# Patient Record
Sex: MALE | Race: WHITE | NOT HISPANIC OR LATINO | Employment: OTHER | URBAN - METROPOLITAN AREA
[De-identification: names, ages, dates, MRNs, and addresses within clinical notes are randomized per-mention and may not be internally consistent; named-entity substitution may affect disease eponyms.]

---

## 2017-01-02 ENCOUNTER — MYC MEDICAL ADVICE (OUTPATIENT)
Dept: PEDIATRICS | Facility: CLINIC | Age: 57
End: 2017-01-02

## 2017-01-03 ENCOUNTER — OFFICE VISIT (OUTPATIENT)
Dept: PEDIATRICS | Facility: CLINIC | Age: 57
End: 2017-01-03
Payer: COMMERCIAL

## 2017-01-03 VITALS
RESPIRATION RATE: 16 BRPM | DIASTOLIC BLOOD PRESSURE: 84 MMHG | BODY MASS INDEX: 30.44 KG/M2 | SYSTOLIC BLOOD PRESSURE: 120 MMHG | TEMPERATURE: 97.9 F | WEIGHT: 250 LBS | HEIGHT: 76 IN | HEART RATE: 88 BPM

## 2017-01-03 DIAGNOSIS — F32.0 MAJOR DEPRESSIVE DISORDER, SINGLE EPISODE, MILD (H): ICD-10-CM

## 2017-01-03 DIAGNOSIS — F43.0 ACUTE REACTION TO STRESS: Primary | ICD-10-CM

## 2017-01-03 PROCEDURE — 99214 OFFICE O/P EST MOD 30 MIN: CPT | Performed by: INTERNAL MEDICINE

## 2017-01-03 RX ORDER — ESCITALOPRAM OXALATE 10 MG/1
5-10 TABLET ORAL DAILY
Qty: 90 TABLET | Refills: 3 | Status: SHIPPED | OUTPATIENT
Start: 2017-01-03 | End: 2017-01-31

## 2017-01-03 ASSESSMENT — ANXIETY QUESTIONNAIRES
IF YOU CHECKED OFF ANY PROBLEMS ON THIS QUESTIONNAIRE, HOW DIFFICULT HAVE THESE PROBLEMS MADE IT FOR YOU TO DO YOUR WORK, TAKE CARE OF THINGS AT HOME, OR GET ALONG WITH OTHER PEOPLE: SOMEWHAT DIFFICULT
3. WORRYING TOO MUCH ABOUT DIFFERENT THINGS: SEVERAL DAYS
2. NOT BEING ABLE TO STOP OR CONTROL WORRYING: NOT AT ALL
5. BEING SO RESTLESS THAT IT IS HARD TO SIT STILL: NOT AT ALL
7. FEELING AFRAID AS IF SOMETHING AWFUL MIGHT HAPPEN: MORE THAN HALF THE DAYS
1. FEELING NERVOUS, ANXIOUS, OR ON EDGE: SEVERAL DAYS
GAD7 TOTAL SCORE: 7
6. BECOMING EASILY ANNOYED OR IRRITABLE: NOT AT ALL

## 2017-01-03 ASSESSMENT — PATIENT HEALTH QUESTIONNAIRE - PHQ9: 5. POOR APPETITE OR OVEREATING: NEARLY EVERY DAY

## 2017-01-03 NOTE — PATIENT INSTRUCTIONS
Major Depression  What is major depression?   Depression is a condition in which you feel sad, hopeless, and uninterested in daily life. Major depression is severe depression that lasts for at least 2 full weeks.   How does it occur?   Major depression may start after some event or it may not be caused by anything specific. You may have major depression after a period of having dysthymia. Dysthymia is being mildly depressed almost every day for 2 or more years. If major depression develops from dysthymia, you are more likely to have major depression in the future.   People are more likely to develop depression if they:   have family members who have had depression, bipolar disorder, or anxiety problems   are female. Women are twice as likely as men to have major depression   have a major medical problem such as heart disease or cancer   The chemicals in your nervous system and the way that brain cells communicate changes with major depression. Exactly how this works and what it means are not fully understood.   Major depression may start at any age. Teenagers and young adults, as well as older adults, are more likely to have this condition than middle-aged adults.   What are the symptoms?   Besides feeling very sad and uninterested in things you usually enjoy, you may also:   be irritable   have trouble falling asleep, wake up very early, or sleep too much   feel more anxiety or panic   notice changes in your appetite and weight, either up or down   notice changes in your energy level, usually down but sometimes feeling overexcited   lose sexual desire and function   feel worthless and guilty   have trouble concentrating or remembering things   feel hopeless or just not care about anything   have unexplained physical symptoms   think often about death or suicide   Other symptoms may vary with age. If you are a teenager, you may be irritable, get angry, abuse substances, and cause trouble with parents  and at school. If you are a young or middle-aged adult, you may abuse substances such as drugs or alcohol, have physical problems (like pain or stomach upsets), or feel nervous.   Depressed older people are more likely to complain of physical problems than that they are feeling sad, anxious, or hopeless. Tiredness, mood changes, sleepiness, and memory problems may be side effects of medicines rather than symptoms of depression. Other medical conditions, such as diabetes, heart disease, and Alzheimer's disease, can also cause similar symptoms.   How is it diagnosed?   Your healthcare provider or a mental health professional will ask about your symptoms and any drug or alcohol use. You may have lab tests to rule out medical problems such as hormone imbalances. There are no lab tests that directly diagnose depression.   How is it treated?   Do not try to overcome clinical depression by yourself. It can usually be successfully treated with psychotherapy, antidepressant medicine, or both. Discuss this with your healthcare provider or therapist.   Medicine  Several types of prescription medicines can help treat major depression. Your healthcare provider will work with you to carefully select the right medicine for you.   You must take these medicines daily for 3 to 6 weeks to get full benefit from them. Most people benefit from taking these medicines for at least 6 months.   No nonprescription medicines are effective to treat major depression.   Psychotherapy   Seeing a mental health therapist can help with all forms of depression. You may need therapy for a short time or for many months. One very helpful form of psychotherapy is cognitive behavioral therapy (CBT). CBT helps you identify and change thought processes that can lead to depression. Replacing negative thoughts with more positive ones reduces depression. Interpersonal therapy has also been shown to work very well.   Diets rich in fruits and vegetables are  recommended for people with depression. A multivitamin and mineral supplement may also be recommended.   Claims have been made that certain herbal and dietary products help control depression symptoms. Omega-3 fatty acids may help to reduce symptoms of depression. Dugway's wort may help mild symptoms of depression. It will not help severe cases of depression. It may worsen bipolar disorder. No herb or dietary supplement has been proven to consistently or completely relieve depression. Supplements are not tested or standardized and may vary in strengths and effects. They may have side effects and are not always safe.   Learning ways to relax may help. Yoga and meditation may also be helpful. You may want to talk with your healthcare provider about using these methods along with medicines and psychotherapy.   How long will the effects last?   Major depression usually improves within a few weeks. Some people have it only once, while others have many episodes. Major depression can be shortened, and possibly prevented, with treatment.   What can I do to help myself or my loved one?   Seeking treatment quickly is the best thing to do. Watch closely for the signs of depression. Get treatment before the symptoms become bad.   Certain medicines can add to the symptoms of depression. If you have had depression, tell all healthcare providers who treat you about all medicines you are taking, including nonprescription products and natural remedies.   Maintaining a healthy lifestyle and social activities are most important. To help prevent depression:   Exercise for at least 30 minutes every day, for example a brisk walk.   Learn which activities make you feel better and do them often.   Talk to your family and friends.   Eat a healthy diet.   Avoid alcohol, caffeine, and nicotine.   Do not use drugs.   Learn ways to lower stress, such as breathing and muscle relaxation exercises.   When should I seek help?   If you are showing  "the signs of major depression, seek professional help quickly. Do not try to treat your depression by yourself. Professional treatment is necessary.   Most of the time, you will feel much better after a few weeks of treatment. Some people with untreated major depression commit suicide. Many more attempt suicide or try to hurt themselves. After treatment and feeling better, these same people usually cannot believe that once they felt so bad and wanted to die.   Get emergency care if you or a loved one has serious thoughts of suicide or harming others.   For more information, see:   Depression: Its Symptoms and Treatment  Adjustment Disorders with Depressed Mood  Cognitive Therapy    Published by "[employer].  This content is reviewed periodically and is subject to change as new health information becomes available. The information is intended to inform and educate and is not a replacement for medical evaluation, advice, diagnosis or treatment by a healthcare professional.   Written by Robina Huston, PhD, for "[employer].   ? 2010 [facility] and/or its affiliates. All Rights Reserved.   Copyright   [employer] 2011  Adult Health Advisor                       Generalized Anxiety Disorder  What is generalized anxiety disorder?   Generalized anxiety disorder (ROLAN) is a condition in which a person worries excessively and unrealistically. They may also be jittery, restless, or dizzy. When these symptoms last for at least 6 months, a diagnosis of ROLAN may be made.  ROLAN may exist by itself, or with both anxiety and depression. It is estimated that almost 5% of people have had this disorder during their lives.  How does it occur?   The cause of ROLAN is unknown. Genetic and environmental factors play a role. Women have ROLAN about twice as often as men.  The worry in ROLAN is not about panic attacks or being afraid in public places. It is typically \"free-floating\" anxiety out of proportion to any real life situation. " The worrying can interfere with normal day-to-day activities and work or school.  What are the symptoms?   Symptoms include excessive, unrealistic, and uncontrollable worrying about many things such as:  the state of the world   the economy   violence in society   your job   the bills   chores   family members  Physical symptoms such as muscle tension, sleep problems, or feeling on edge usually go along with anxiety. A person may be short-tempered and unable to focus or concentrate because of the worrying. Other symptoms include sweating, shaking, having a very fast heartbeat, feeling out of breath, needing to go to the bathroom often and feeling like fainting. People with ROLAN may be uneasy in a group or in a waiting room.  How is it diagnosed?   There is no lab test for ROLAN. Your healthcare provider or therapist will ask about your symptoms. He or she will make sure you do not have a medical illness or drug or alcohol problem that could cause the symptoms. Some medicines can cause anxiety or make it worse. These include asthma medicines, stimulants, and steroids such as prednisone.  If you have had the symptoms for at least 6 months, if you have had to cut back on your activities, and if you find it difficult to get things done, you may be diagnosed with generalized anxiety disorder.  How is it treated?   Different types of approaches have proven helpful in treating ROLAN. These include medicine, behavior therapy, relaxation therapy, cognitive therapy, and stress management techniques. Which treatments your healthcare provider or therapist uses may depend upon how much the disorder interferes with your day-to-day life.  Several types of medicines can help treat ROLAN. Your healthcare provider will work with you to carefully select the best one for you.  How long will the effects last?   ROLAN can last many years and sometimes an entire lifetime.   How can I take care of myself?   Get support. Talk with family and  friends. Consider joining a support group in your area. Go to a stress management class in your local community.   Learn to manage stress. Ask for help at home and work when the load is too great to handle. Find ways to relax, for example take up a hobby, listen to music, watch movies, take walks. Try deep breathing exercises when you feel stressed.   Take care of your physical health. Try to get at least 7 to 9 hours of sleep each night. Eat a healthy diet. Limit caffeine. If you smoke, quit. Avoid alcohol and drugs, because they can make your symptoms worse. Exercise according to your healthcare provider's instructions.   Check your medicines. To help prevent problems, tell your healthcare provider and pharmacist about all the medicines, natural remedies, vitamins, and other supplements that you take.   Contact your healthcare provider or therapist if you have any questions or your symptoms seem to be getting worse.  You may also want to contact [facility] (formerly the [employer] or [employer]HA). NMHA's toll-free Information Center number is 4-909-835-[facility]. Its web site address is http://www.NMHA.org

## 2017-01-03 NOTE — NURSING NOTE
"Chief Complaint   Patient presents with     RECHECK       Initial /84 mmHg  Pulse 88  Temp(Src) 97.9  F (36.6  C) (Oral)  Resp 16  Ht 6' 4\" (1.93 m)  Wt 250 lb (113.399 kg)  BMI 30.44 kg/m2 Estimated body mass index is 30.44 kg/(m^2) as calculated from the following:    Height as of this encounter: 6' 4\" (1.93 m).    Weight as of this encounter: 250 lb (113.399 kg).  BP completed using cuff size: large Rt arm  Donna QUINTERO, MIKAYLA,AAMA      "

## 2017-01-03 NOTE — MR AVS SNAPSHOT
After Visit Summary   1/3/2017    Sang Mtz    MRN: [id number]           Patient Information     Date Of Birth          1960        Visit Information        Provider Department      1/3/2017 1:40 PM Jethro Villalba MD [facility]        Today's Diagnoses     Major depressive disorder, single episode, mild (H)    -  1     Acute reaction to stress           Care Instructions                 Major Depression  What is major depression?   Depression is a condition in which you feel sad, hopeless, and uninterested in daily life. Major depression is severe depression that lasts for at least 2 full weeks.   How does it occur?   Major depression may start after some event or it may not be caused by anything specific. You may have major depression after a period of having dysthymia. Dysthymia is being mildly depressed almost every day for 2 or more years. If major depression develops from dysthymia, you are more likely to have major depression in the future.   People are more likely to develop depression if they:   have family members who have had depression, bipolar disorder, or anxiety problems   are female. Women are twice as likely as men to have major depression   have a major medical problem such as heart disease or cancer   The chemicals in your nervous system and the way that brain cells communicate changes with major depression. Exactly how this works and what it means are not fully understood.   Major depression may start at any age. Teenagers and young adults, as well as older adults, are more likely to have this condition than middle-aged adults.   What are the symptoms?   Besides feeling very sad and uninterested in things you usually enjoy, you may also:   be irritable   have trouble falling asleep, wake up very early, or sleep too much   feel more anxiety or panic   notice changes in your appetite and weight, either up or down   notice changes in your energy level,  usually down but sometimes feeling overexcited   lose sexual desire and function   feel worthless and guilty   have trouble concentrating or remembering things   feel hopeless or just not care about anything   have unexplained physical symptoms   think often about death or suicide   Other symptoms may vary with age. If you are a teenager, you may be irritable, get angry, abuse substances, and cause trouble with parents and at school. If you are a young or middle-aged adult, you may abuse substances such as drugs or alcohol, have physical problems (like pain or stomach upsets), or feel nervous.   Depressed older people are more likely to complain of physical problems than that they are feeling sad, anxious, or hopeless. Tiredness, mood changes, sleepiness, and memory problems may be side effects of medicines rather than symptoms of depression. Other medical conditions, such as diabetes, heart disease, and Alzheimer's disease, can also cause similar symptoms.   How is it diagnosed?   Your healthcare provider or a mental health professional will ask about your symptoms and any drug or alcohol use. You may have lab tests to rule out medical problems such as hormone imbalances. There are no lab tests that directly diagnose depression.   How is it treated?   Do not try to overcome clinical depression by yourself. It can usually be successfully treated with psychotherapy, antidepressant medicine, or both. Discuss this with your healthcare provider or therapist.   Medicine  Several types of prescription medicines can help treat major depression. Your healthcare provider will work with you to carefully select the right medicine for you.   You must take these medicines daily for 3 to 6 weeks to get full benefit from them. Most people benefit from taking these medicines for at least 6 months.   No nonprescription medicines are effective to treat major depression.   Psychotherapy   Seeing a mental health therapist can help with  all forms of depression. You may need therapy for a short time or for many months. One very helpful form of psychotherapy is cognitive behavioral therapy (CBT). CBT helps you identify and change thought processes that can lead to depression. Replacing negative thoughts with more positive ones reduces depression. Interpersonal therapy has also been shown to work very well.   Diets rich in fruits and vegetables are recommended for people with depression. A multivitamin and mineral supplement may also be recommended.   Claims have been made that certain herbal and dietary products help control depression symptoms. Omega-3 fatty acids may help to reduce symptoms of depression. Corby's wort may help mild symptoms of depression. It will not help severe cases of depression. It may worsen bipolar disorder. No herb or dietary supplement has been proven to consistently or completely relieve depression. Supplements are not tested or standardized and may vary in strengths and effects. They may have side effects and are not always safe.   Learning ways to relax may help. Yoga and meditation may also be helpful. You may want to talk with your healthcare provider about using these methods along with medicines and psychotherapy.   How long will the effects last?   Major depression usually improves within a few weeks. Some people have it only once, while others have many episodes. Major depression can be shortened, and possibly prevented, with treatment.   What can I do to help myself or my loved one?   Seeking treatment quickly is the best thing to do. Watch closely for the signs of depression. Get treatment before the symptoms become bad.   Certain medicines can add to the symptoms of depression. If you have had depression, tell all healthcare providers who treat you about all medicines you are taking, including nonprescription products and natural remedies.   Maintaining a healthy lifestyle and social activities are most  important. To help prevent depression:   Exercise for at least 30 minutes every day, for example a brisk walk.   Learn which activities make you feel better and do them often.   Talk to your family and friends.   Eat a healthy diet.   Avoid alcohol, caffeine, and nicotine.   Do not use drugs.   Learn ways to lower stress, such as breathing and muscle relaxation exercises.   When should I seek help?   If you are showing the signs of major depression, seek professional help quickly. Do not try to treat your depression by yourself. Professional treatment is necessary.   Most of the time, you will feel much better after a few weeks of treatment. Some people with untreated major depression commit suicide. Many more attempt suicide or try to hurt themselves. After treatment and feeling better, these same people usually cannot believe that once they felt so bad and wanted to die.   Get emergency care if you or a loved one has serious thoughts of suicide or harming others.   For more information, see:   Depression: Its Symptoms and Treatment  Adjustment Disorders with Depressed Mood  Cognitive Therapy    Published by [employer].  This content is reviewed periodically and is subject to change as new health information becomes available. The information is intended to inform and educate and is not a replacement for medical evaluation, advice, diagnosis or treatment by a healthcare professional.   Written by Robina Huston, PhD, for [employer].   ? 2010 [employer] and/or its affiliates. All Rights Reserved.   Copyright   [employer] 2011  Adult Health Advisor                       Generalized Anxiety Disorder  What is generalized anxiety disorder?   Generalized anxiety disorder (ROLAN) is a condition in which a person worries excessively and unrealistically. They may also be jittery, restless, or dizzy. When these symptoms last for at least 6 months, a diagnosis of ROLAN may be made.  ROLAN may exist by itself, or  "with both anxiety and depression. It is estimated that almost 5% of people have had this disorder during their lives.  How does it occur?   The cause of ROLAN is unknown. Genetic and environmental factors play a role. Women have ROLAN about twice as often as men.  The worry in ROLAN is not about panic attacks or being afraid in public places. It is typically \"free-floating\" anxiety out of proportion to any real life situation. The worrying can interfere with normal day-to-day activities and work or school.  What are the symptoms?   Symptoms include excessive, unrealistic, and uncontrollable worrying about many things such as:  the state of the world   the economy   violence in society   your job   the bills   chores   family members  Physical symptoms such as muscle tension, sleep problems, or feeling on edge usually go along with anxiety. A person may be short-tempered and unable to focus or concentrate because of the worrying. Other symptoms include sweating, shaking, having a very fast heartbeat, feeling out of breath, needing to go to the bathroom often and feeling like fainting. People with ROLAN may be uneasy in a group or in a waiting room.  How is it diagnosed?   There is no lab test for ROLAN. Your healthcare provider or therapist will ask about your symptoms. He or she will make sure you do not have a medical illness or drug or alcohol problem that could cause the symptoms. Some medicines can cause anxiety or make it worse. These include asthma medicines, stimulants, and steroids such as prednisone.  If you have had the symptoms for at least 6 months, if you have had to cut back on your activities, and if you find it difficult to get things done, you may be diagnosed with generalized anxiety disorder.  How is it treated?   Different types of approaches have proven helpful in treating ROLAN. These include medicine, behavior therapy, relaxation therapy, cognitive therapy, and stress management techniques. Which " treatments your healthcare provider or therapist uses may depend upon how much the disorder interferes with your day-to-day life.  Several types of medicines can help treat ROLAN. Your healthcare provider will work with you to carefully select the best one for you.  How long will the effects last?   ROLAN can last many years and sometimes an entire lifetime.   How can I take care of myself?   Get support. Talk with family and friends. Consider joining a support group in your area. Go to a stress management class in your local community.   Learn to manage stress. Ask for help at home and work when the load is too great to handle. Find ways to relax, for example take up a hobby, listen to music, watch movies, take walks. Try deep breathing exercises when you feel stressed.   Take care of your physical health. Try to get at least 7 to 9 hours of sleep each night. Eat a healthy diet. Limit caffeine. If you smoke, quit. Avoid alcohol and drugs, because they can make your symptoms worse. Exercise according to your healthcare provider's instructions.   Check your medicines. To help prevent problems, tell your healthcare provider and pharmacist about all the medicines, natural remedies, vitamins, and other supplements that you take.   Contact your healthcare provider or therapist if you have any questions or your symptoms seem to be getting worse.  You may also want to contact [facility] (formerly the National Mental Health Association or NM). [facility]'s toll-free Information Center number is 1-463-602-[facility]. Its web site address is http://[email or web address].[email or web address]            Follow-ups after your visit        Additional Services     MENTAL HEALTH REFERRAL       Your provider has referred you to: [facility]: [facility] - Counseling (Individual/Couples/Family) - [facility] [place] [phone]   *Patient will be contacted by [facility] scheduling partner, [facility]), to schedule an  appointment.  Patients may also call [facility] to schedule.    All scheduling is subject to the client's specific insurance plan & benefits, provider/location availability, and provider clinical specialities.  Please arrive 15 minutes early for your first appointment and bring your completed paperwork.    Please be aware that coverage of these services is subject to the terms and limitations of your health insurance plan.  Call member services at your health plan with any benefit or coverage questions.                  Your next 10 appointments already scheduled     Jan 31, 2017  2:40 PM   Office Visit with Jethro Villalba MD   [facility] ([facility])    [address] [place] [address]  [address]  [facility] [address] [phone]           Bring a current list of meds and any records pertaining to this visit.  For Physicals, please bring immunization records and any forms needing to be filled out.  Please arrive 10 minutes early to complete paperwork.              Who to contact     If you have questions or need follow up information about today's clinic visit or your schedule please contact [facility] directly at [phone].  Normal or non-critical lab and imaging results will be communicated to you by MyChart, letter or phone within 4 business days after the clinic has received the results. If you do not hear from us within 7 days, please contact the clinic through [facility] or phone. If you have a critical or abnormal lab result, we will notify you by phone as soon as possible.  Submit refill requests through [facility] or call your pharmacy and they will forward the refill request to us. Please allow 3 business days for your refill to be completed.          Additional Information About Your Visit        [employer] Information     [facility] gives you secure access to your electronic health record. If you see a primary care provider, you can also send messages to your care team and make  "appointments. If you have questions, please call your primary care clinic.  If you do not have a primary care provider, please call [phone] and they will assist you.        Care EveryWhere ID     This is your Care EveryWhere ID. This could be used by other organizations to access your [facility] medical records  [id number]        Your Vitals Were     Pulse Temperature Respirations Height BMI (Body Mass Index)       88 97.9  F (36.6  C) (Oral) 16 6' 4\" (1.93 m) 30.44 kg/m2        Blood Pressure from Last 3 Encounters:   01/03/17 120/84   12/29/16 133/87   12/27/16 124/78    Weight from Last 3 Encounters:   01/03/17 250 lb (113.399 kg)   12/29/16 250 lb (113.399 kg)   12/27/16 254 lb 4.8 oz (115.35 kg)              We Performed the Following     MENTAL HEALTH REFERRAL          Today's Medication Changes          These changes are accurate as of: 1/3/17  2:11 PM.  If you have any questions, ask your nurse or doctor.               These medicines have changed or have updated prescriptions.        Dose/Directions    escitalopram 10 MG tablet   Commonly known as:  LEXAPRO   This may have changed:  how much to take   Used for:  Major depressive disorder, single episode, mild (H), Acute reaction to stress   Changed by:  Jethro Villalba MD        Dose:  5-10 mg   Take 0.5-1 tablets (5-10 mg) by mouth daily   Quantity:  90 tablet   Refills:  3            Where to get your medicines      These medications were sent to [facility] [place] - [address]   [address]  [address], [place] [address]     Phone:  [phone]    - escitalopram 10 MG tablet             Primary Care Provider Office Phone # Fax #    Jethro Villalba [phone]       [facility] [address] [place] [address]        Thank you!     Thank you for choosing [facility]  for your care. Our goal is always to provide you with excellent care. Hearing back from our " patients is one way we can continue to improve our services. Please take a few minutes to complete the written survey that you may receive in the mail after your visit with us. Thank you!             Your Updated Medication List - Protect others around you: Learn how to safely use, store and throw away your medicines at www.disposemymeds.org.          This list is accurate as of: 1/3/17  2:11 PM.  Always use your most recent med list.                   Brand Name Dispense Instructions for use    aspirin 81 MG tablet      1 TABLET DAILY       escitalopram 10 MG tablet    LEXAPRO    90 tablet    Take 0.5-1 tablets (5-10 mg) by mouth daily       fluticasone 50 MCG/ACT spray    FLONASE    16 g    Spray 1-2 sprays into both nostrils daily       ibuprofen 400-800 mg tablet    ADVIL,MOTRIN     Take 400-800 mg by mouth every 6 hours as needed.       Multi-vitamin Tabs tablet   Generic drug:  multivitamin, therapeutic with minerals          omeprazole 20 MG tablet     60 tablet    Take 1 tablet (20 mg) by mouth 2 times daily Take 30-60 minutes before a meal.       sildenafil 100 MG cap/tab    VIAGRA    3 tablet    Take 0.5-1 tablets by mouth daily as needed for erectile dysfunction.

## 2017-01-03 NOTE — PROGRESS NOTES
SUBJECTIVE:                                                    Sang Mtz is a 56 year old male who presents to clinic today for the following health issues:    ED/UC Followup:    Facility:  [facility]  Date of visit: 12/29/2017  Reason for visit: Vertigo  Current Status: Pt stated that haven't experience Sx since ED visit. Pt wonder if recently prescribed medication caused Sx.       patient here with sig other, doing better. has been working on mindfulness, etc and this is helping, is trying to exercise too and feels well supported. did take the ssri for a few days, but hasn't for the last 2. not sure if it was helping or not. wants to review next steps. not thoughts of self harm or hurting others.     Problem list and histories reviewed & adjusted, as indicated.  Additional history: as documented    Patient Active Problem List    Diagnosis Date Noted     Esophageal reflux 11/18/2014     Priority: Medium     ACP (advance care planning) 12/15/2015     Advance Care Planning 12/15/2015: ACP Review of Chart / Resources Provided:  Reviewed chart for advance care plan.  Sang Mtz has no plan or code status on file. Discussed available resources and provided with information. Advance Care Planning invitation sent. Code status updated and sent to provider for review pending further ACP discussions.  Confirmed/documented legally designated decision maker(s). Added by Rachele Junior             Splenomegaly 02/08/2012     Anxiety 02/08/2012     Kidney stone 02/08/2012     Liver nodule 02/08/2012     Mass of lung 02/08/2012     Erectile dysfunction 09/13/2010     Hypertriglyceridemia 09/13/2010     HYPERLIPIDEMIA LDL GOAL <130 02/10/2010     Social History   Substance Use Topics     Smoking status: Never Smoker      Smokeless tobacco: Never Used     Alcohol Use: No     Family History   Problem Relation Age of Onset     Hypertension Father      CANCER Father      cml     C.A.D. Maternal Grandfather      late      "Cancer - colorectal Father      diagnosis at age 75     Eye Disorder No family hx of      no glaucoma       ROS:  A complete 10 point review of systems was taken and negative except for those noted in the subjective/HPI section(s) above     Problem list, Medication list, Allergies, and Medical/Social/Surgical histories reviewed in [facility] and updated as appropriate.    OBJECTIVE:                                                    /84 mmHg  Pulse 88  Temp(Src) 97.9  F (36.6  C) (Oral)  Resp 16  Ht 6' 4\" (1.93 m)  Wt 250 lb (113.399 kg)  BMI 30.44 kg/m2   Constitutional: healthy, alert and no distress  Psychiatric: mentation appears normal and affect normal/bright  Presentation- normal:Abstract reasoning  Attention and concentration  Coherency and relevance of thought  Dress, grooming, personal hygiene  Facial expression  Information  Judgment  Memory  Mood  Orientation  Perceptions  Posture and motor behavoir  Speech  Thought content  Vocabulary,abnormal:none        ROLAN-7 SCORE 1/3/2017   Total Score 7                  ASSESSMENT/PLAN:                                                        ICD-10-CM    1. Acute reaction to stress F43.0 escitalopram (LEXAPRO) 10 MG tablet     MENTAL HEALTH REFERRAL   2. Major depressive disorder, single episode, mild (H) F32.0 escitalopram (LEXAPRO) 10 MG tablet     MENTAL HEALTH REFERRAL   discussed with patient (or patient's parents/caregiver) pathophysiology of condition and treatment options. patient id doing better and has made good progress.  had long talk with patient and his sig other, plan to restart the SSRI at 1/2 tab for 1 week then taper up. will see if patient can get in with our MH provider here and continue with mindfulness exercises, etc. discussed with patient (or patient's parents/caregiver) pathophysiology of condition and treatment options.  Reviewed concept of anxiety (and depression) as function of biochemical imbalance of neurotransmitters/rationale " "for treatment. reviewed mindfulness, good self talk, exercise, good diet and sleep, etc and other modalities to help manage symptoms too.   avoid etoh and other drugs to treat symptoms, will likely make worse with other consequences too  Risks and benefits of medication(s) reviewed with patient.  Questions answered.  Counseling advised  Followup appointment in 1 month(s) - sooner if needed and patient can call anytime  Patient instructed to call for significant side effects medications or problems  Patient advised immediate presentation to hospital for suicidal thought, etc.     Patient verbalized understanding and is agreeable to this plan.           Estimated body mass index is 30.44 kg/(m^2) as calculated from the following:    Height as of this encounter: 6' 4\" (1.93 m).    Weight as of this encounter: 250 lb (113.399 kg).      Return to clinic as needed or if symptoms persist, change, worsen or if any new symptoms develop.    Total time spent with patient was 30 min, of which greater than 25 minutes of face to face time and/or coordination of care discussing the above issue(s).     Jethro Villalba M.D.  Internal Medicine-Pediatrics                "

## 2017-01-04 ASSESSMENT — ANXIETY QUESTIONNAIRES: GAD7 TOTAL SCORE: 7

## 2017-01-26 ENCOUNTER — MYC MEDICAL ADVICE (OUTPATIENT)
Dept: PEDIATRICS | Facility: CLINIC | Age: 57
End: 2017-01-26

## 2017-01-31 ENCOUNTER — OFFICE VISIT (OUTPATIENT)
Dept: PEDIATRICS | Facility: CLINIC | Age: 57
End: 2017-01-31
Payer: COMMERCIAL

## 2017-01-31 VITALS
WEIGHT: 255.8 LBS | TEMPERATURE: 98.1 F | DIASTOLIC BLOOD PRESSURE: 76 MMHG | BODY MASS INDEX: 31.15 KG/M2 | HEART RATE: 104 BPM | RESPIRATION RATE: 20 BRPM | SYSTOLIC BLOOD PRESSURE: 104 MMHG

## 2017-01-31 DIAGNOSIS — F32.0 MAJOR DEPRESSIVE DISORDER, SINGLE EPISODE, MILD (H): ICD-10-CM

## 2017-01-31 DIAGNOSIS — F41.9 ANXIETY: ICD-10-CM

## 2017-01-31 DIAGNOSIS — E78.5 HYPERLIPIDEMIA LDL GOAL <130: ICD-10-CM

## 2017-01-31 DIAGNOSIS — F43.0 ACUTE REACTION TO STRESS: Primary | ICD-10-CM

## 2017-01-31 DIAGNOSIS — Z11.59 NEED FOR HEPATITIS C SCREENING TEST: ICD-10-CM

## 2017-01-31 DIAGNOSIS — K21.9 GASTROESOPHAGEAL REFLUX DISEASE WITHOUT ESOPHAGITIS: ICD-10-CM

## 2017-01-31 LAB
ALBUMIN SERPL-MCNC: 4.2 G/DL (ref 3.4–5)
ALP SERPL-CCNC: 107 U/L (ref 40–150)
ALT SERPL W P-5'-P-CCNC: 49 U/L (ref 0–70)
ANION GAP SERPL CALCULATED.3IONS-SCNC: 7 MMOL/L (ref 3–14)
AST SERPL W P-5'-P-CCNC: 15 U/L (ref 0–45)
BILIRUB SERPL-MCNC: 0.7 MG/DL (ref 0.2–1.3)
BUN SERPL-MCNC: 26 MG/DL (ref 7–30)
CALCIUM SERPL-MCNC: 9.8 MG/DL (ref 8.5–10.1)
CHLORIDE SERPL-SCNC: 107 MMOL/L (ref 94–109)
CHOLEST SERPL-MCNC: 172 MG/DL
CO2 SERPL-SCNC: 27 MMOL/L (ref 20–32)
CREAT SERPL-MCNC: 0.91 MG/DL (ref 0.66–1.25)
GFR SERPL CREATININE-BSD FRML MDRD: 86 ML/MIN/1.7M2
GLUCOSE SERPL-MCNC: 95 MG/DL (ref 70–99)
HCV AB SERPL QL IA: NORMAL
HDLC SERPL-MCNC: 32 MG/DL
LDLC SERPL CALC-MCNC: 111 MG/DL
NONHDLC SERPL-MCNC: 140 MG/DL
POTASSIUM SERPL-SCNC: 4.2 MMOL/L (ref 3.4–5.3)
PROT SERPL-MCNC: 7.4 G/DL (ref 6.8–8.8)
PSA SERPL-ACNC: 1.49 UG/L (ref 0–4)
SODIUM SERPL-SCNC: 141 MMOL/L (ref 133–144)
TRIGL SERPL-MCNC: 147 MG/DL

## 2017-01-31 PROCEDURE — 36415 COLL VENOUS BLD VENIPUNCTURE: CPT | Performed by: INTERNAL MEDICINE

## 2017-01-31 PROCEDURE — G0103 PSA SCREENING: HCPCS | Performed by: INTERNAL MEDICINE

## 2017-01-31 PROCEDURE — 86803 HEPATITIS C AB TEST: CPT | Performed by: INTERNAL MEDICINE

## 2017-01-31 PROCEDURE — 80053 COMPREHEN METABOLIC PANEL: CPT | Performed by: INTERNAL MEDICINE

## 2017-01-31 PROCEDURE — 99213 OFFICE O/P EST LOW 20 MIN: CPT | Performed by: INTERNAL MEDICINE

## 2017-01-31 PROCEDURE — 80061 LIPID PANEL: CPT | Performed by: INTERNAL MEDICINE

## 2017-01-31 RX ORDER — ESCITALOPRAM OXALATE 10 MG/1
10 TABLET ORAL DAILY
Qty: 90 TABLET | Refills: 3 | Status: SHIPPED | OUTPATIENT
Start: 2017-01-31 | End: 2017-07-10

## 2017-01-31 ASSESSMENT — ANXIETY QUESTIONNAIRES
1. FEELING NERVOUS, ANXIOUS, OR ON EDGE: MORE THAN HALF THE DAYS
3. WORRYING TOO MUCH ABOUT DIFFERENT THINGS: NOT AT ALL
2. NOT BEING ABLE TO STOP OR CONTROL WORRYING: NOT AT ALL
GAD7 TOTAL SCORE: 2
IF YOU CHECKED OFF ANY PROBLEMS ON THIS QUESTIONNAIRE, HOW DIFFICULT HAVE THESE PROBLEMS MADE IT FOR YOU TO DO YOUR WORK, TAKE CARE OF THINGS AT HOME, OR GET ALONG WITH OTHER PEOPLE: SOMEWHAT DIFFICULT
6. BECOMING EASILY ANNOYED OR IRRITABLE: NOT AT ALL
7. FEELING AFRAID AS IF SOMETHING AWFUL MIGHT HAPPEN: NOT AT ALL
5. BEING SO RESTLESS THAT IT IS HARD TO SIT STILL: NOT AT ALL

## 2017-01-31 ASSESSMENT — PATIENT HEALTH QUESTIONNAIRE - PHQ9: 5. POOR APPETITE OR OVEREATING: NOT AT ALL

## 2017-01-31 NOTE — PATIENT INSTRUCTIONS
Your provider has referred you to: [facility]: [facility] - Counseling (Individual/Couples/Family) - [facility] [place] [phone]   *Patient will be contacted by [facility]'s scheduling partner, [facility]), to schedule an appointment.  Patients may also call [facility] to schedule.    All scheduling is subject to the client's specific insurance plan & benefits, provider/location availability, and provider clinical specialities.  Please arrive 15 minutes early for your first appointment and bring your completed paperwork.    Please be aware that coverage of these services is subject to the terms and limitations of your health insurance plan.  Call member services at your health plan with any benefit or coverage questions.

## 2017-01-31 NOTE — PROGRESS NOTES
SUBJECTIVE:                                                    Sang Mtz is a 56 year old male who presents to clinic today for the following health issues:      Depression and Anxiety Follow-Up    Status since last visit: Improved, both patient and his wife have noted sig improvement with medications and with MH evaluation    Other associated symptoms:tired after taking medication    Complicating factors:     Significant life event: Yes-  USP     Current substance abuse: None    No flowsheet data found.  ROLAN-7 SCORE 1/3/2017   Total Score 7        PHQ-9  English      PHQ-9   Any Language     GAD7         Amount of exercise or physical activity: 4-5 days/week for an average of 45-60 minutes    Problems taking medications regularly: No    Medication side effects: very sleepy after taking medication    Diet: regular (no restrictions)    PHQ-9 SCORE 1/31/2017   Total Score 0     ROLAN-7 SCORE 1/3/2017 1/31/2017   Total Score 7 2     patient addisong kalin, on 1/2 tab per day and tolerating well. hasn't scheduled with therapist yet, needs number, had reflux episosde alst night, on PPI and needed to take tums and drink some water, was pretty painful but resolved in 30 min. ate some peanuts and that is what triggered it. no symtposm today. no chest pain or shortness of breath. No other concerns or complaints today.         Problem list and histories reviewed & adjusted, as indicated.  Additional history: as documented    Patient Active Problem List    Diagnosis Date Noted     Esophageal reflux 11/18/2014     Priority: Medium     ACP (advance care planning) 12/15/2015     Advance Care Planning 12/15/2015: ACP Review of Chart / Resources Provided:  Reviewed chart for advance care plan.  Sang Mtz has no plan or code status on file. Discussed available resources and provided with information. Advance Care Planning invitation sent. Code status updated and sent to provider for review pending further ACP  discussions.  Confirmed/documented legally designated decision maker(s). Added by Rachele Junior             Splenomegaly 02/08/2012     Anxiety 02/08/2012     Kidney stone 02/08/2012     Liver nodule 02/08/2012     Mass of lung 02/08/2012     Erectile dysfunction 09/13/2010     Hypertriglyceridemia 09/13/2010     HYPERLIPIDEMIA LDL GOAL <130 02/10/2010     Social History   Substance Use Topics     Smoking status: Never Smoker      Smokeless tobacco: Never Used     Alcohol Use: No     Family History   Problem Relation Age of Onset     Hypertension Father      CANCER Father      cml     C.A.D. Maternal Grandfather      late     Cancer - colorectal Father      diagnosis at age 75     Eye Disorder No family hx of      no glaucoma       ROS:  A complete 10 point review of systems was taken and negative except for those noted in the subjective/HPI section(s) above     Problem list, Medication list, Allergies, and Medical/Social/Surgical histories reviewed in EPIC and updated as appropriate.    OBJECTIVE:                                                    /76 mmHg  Pulse 104  Temp(Src) 98.1  F (36.7  C) (Oral)  Resp 20  Wt 255 lb 12.8 oz (116.03 kg)   Constitutional: healthy, alert and no distress  Psychiatric: mentation appears normal and affect normal/bright  Presentation- normal:Abstract reasoning  Attention and concentration  Coherency and relevance of thought  Dress, grooming, personal hygiene  Facial expression  Information  Judgment  Memory  Mood  Orientation  Perceptions  Posture and motor behavoir  Speech  Thought content  Vocabulary  ,abnormal:NONE    PHQ-9 SCORE 1/31/2017   Total Score 0       ROLAN-7 SCORE 1/3/2017 1/31/2017   Total Score 7 2                ASSESSMENT/PLAN:                                                    (F43.0) Acute reaction to stress  (primary encounter diagnosis)  Comment: mood and symptoms markedly imprved, continue SSRI and MH support. Reviewed concept of depression as function  of biochemical imbalance of neurotransmitters/rationale for treatment. increase lexapro to 10 mg (1 tab). New medication(s) indication(s), adverse effects, risks and benefits discussed.   Risks and benefits of medication(s) reviewed with patient.  Questions answered.  Followup appointment in 3 month(s)  Patient instructed to call for significant side effects medications or problems  Patient advised immediate presentation to hospital for suicidal thought, etc.     Plan: escitalopram (LEXAPRO) 10 MG tablet          (F41.9) Anxiety  Comment: as above. discussed with patient (or patient's parents/caregiver) pathophysiology of condition and treatment options.  Reviewed concept of anxiety (and depression) as function of biochemical imbalance of neurotransmitters/rationale for treatment. reviewed mindfulness, good self talk, exercise, good diet and sleep, etc and other modalities to help manage symptoms too.   avoid etoh and other drugs to treat symptoms, will likely make worse with other consequences too  Risks and benefits of medication(s) reviewed with patient.  Questions answered.  Counseling advised  Followup appointment in 3 month(s) - appointment made   Patient instructed to call for significant side effects medications or problems  Patient advised immediate presentation to hospital for suicidal thought, etc.     Patient verbalized understanding and is agreeable to this plan.      (K21.9) Gastroesophageal reflux disease without esophagitis  Comment: discussed with patient (or patient's parents/caregiver) pathophysiology of condition and treatment options.  plan to continue with as needed H@ an dtums, may need to consider PPI and further evaluation if persists, patient confident dietary adjustments would be all is needed and will keep me posted.     (F32.0) Major depressive disorder, single episode, mild (H)  Comment: as above. Reviewed concept of depression as function of biochemical imbalance of  "neurotransmitters/rationale for treatment.   Plan: escitalopram (LEXAPRO) 10 MG tablet            Estimated body mass index is 30.44 kg/(m^2) as calculated from the following:    Height as of 1/3/17: 6' 4\" (1.93 m).    Weight as of 1/3/17: 250 lb (113.399 kg).      Return to clinic as needed or if symptoms persist, change, worsen or if any new symptoms develop.    Jethro Villalba M.D.  Internal Medicine-Pediatrics                "

## 2017-01-31 NOTE — NURSING NOTE
"Chief Complaint   Patient presents with     Depression     Anxiety       Initial /76 mmHg  Pulse 104  Temp(Src) 98.1  F (36.7  C) (Oral)  Resp 20  Wt 255 lb 12.8 oz (116.03 kg) Estimated body mass index is 31.15 kg/(m^2) as calculated from the following:    Height as of 1/3/17: 6' 4\" (1.93 m).    Weight as of this encounter: 255 lb 12.8 oz (116.03 kg).  BP completed using cuff size: large Rt arm  Donna QUINTERO, MIKAYLA,AAMA      "

## 2017-01-31 NOTE — MR AVS SNAPSHOT
After Visit Summary   1/31/2017    Sang Mtz    MRN: [id number]           Patient Information     Date Of Birth          1960        Visit Information        Provider Department      1/31/2017 2:40 PM Jethro Villalba MD [facility] [place]        Today's Diagnoses     Acute reaction to stress    -  1     Anxiety         Gastroesophageal reflux disease without esophagitis           Care Instructions    Your provider has referred you to: [facility]: [facility] - Counseling (Individual/Couples/Family) - [facility] [place] [phone]   *Patient will be contacted by [facility] scheduling partner, [facility]), to schedule an appointment.  Patients may also call [facility] to schedule.    All scheduling is subject to the client's specific insurance plan & benefits, provider/location availability, and provider clinical specialities.  Please arrive 15 minutes early for your first appointment and bring your completed paperwork.    Please be aware that coverage of these services is subject to the terms and limitations of your health insurance plan.  Call member services at your health plan with any benefit or coverage questions.        Follow-ups after your visit        Who to contact     If you have questions or need follow up information about today's clinic visit or your schedule please contact [facility] directly at [phone].  Normal or non-critical lab and imaging results will be communicated to you by MyChart, letter or phone within 4 business days after the clinic has received the results. If you do not hear from us within 7 days, please contact the clinic through MyChart or phone. If you have a critical or abnormal lab result, we will notify you by phone as soon as possible.  Submit refill requests through [employer] or call your pharmacy and they will forward the refill request to us. Please allow 3 business days for your refill to  be completed.          Additional Information About Your Visit        [employer] Information     [employer] gives you secure access to your electronic health record. If you see a primary care provider, you can also send messages to your care team and make appointments. If you have questions, please call your primary care clinic.  If you do not have a primary care provider, please call [phone] and they will assist you.        Care EveryWhere ID     This is your Care EveryWhere ID. This could be used by other organizations to access your [facility] medical records  [id number]        Your Vitals Were     Pulse Temperature Respirations             104 98.1  F (36.7  C) (Oral) 20          Blood Pressure from Last 3 Encounters:   01/31/17 104/76   01/03/17 120/84   12/29/16 133/87    Weight from Last 3 Encounters:   01/31/17 255 lb 12.8 oz (116.03 kg)   01/03/17 250 lb (113.399 kg)   12/29/16 250 lb (113.399 kg)              Today, you had the following     No orders found for display       Primary Care Provider Office Phone # Fax #    Jethro Villalba [phone]       [facility] [address] [place] [address]        Thank you!     Thank you for choosing [facility]  for your care. Our goal is always to provide you with excellent care. Hearing back from our patients is one way we can continue to improve our services. Please take a few minutes to complete the written survey that you may receive in the mail after your visit with us. Thank you!             Your Updated Medication List - Protect others around you: Learn how to safely use, store and throw away your medicines at www.disposemymeds.org.          This list is accurate as of: 1/31/17  2:42 PM.  Always use your most recent med list.                   Brand Name Dispense Instructions for use    aspirin 81 MG tablet      1 TABLET DAILY       escitalopram 10 MG tablet    LEXAPRO    90 tablet    Take 0.5-1 tablets (5-10 mg) by  mouth daily       fluticasone 50 MCG/ACT spray    FLONASE    16 g    Spray 1-2 sprays into both nostrils daily       ibuprofen 400-800 mg tablet    ADVIL,MOTRIN     Take 400-800 mg by mouth every 6 hours as needed.       Multi-vitamin Tabs tablet   Generic drug:  multivitamin, therapeutic with minerals          omeprazole 20 MG tablet     60 tablet    Take 1 tablet (20 mg) by mouth 2 times daily Take 30-60 minutes before a meal.       sildenafil 100 MG cap/tab    VIAGRA    3 tablet    Take 0.5-1 tablets by mouth daily as needed for erectile dysfunction.

## 2017-02-01 ASSESSMENT — PATIENT HEALTH QUESTIONNAIRE - PHQ9: SUM OF ALL RESPONSES TO PHQ QUESTIONS 1-9: 0

## 2017-02-01 ASSESSMENT — ANXIETY QUESTIONNAIRES: GAD7 TOTAL SCORE: 2

## 2017-02-06 PROBLEM — F32.0 MAJOR DEPRESSIVE DISORDER, SINGLE EPISODE, MILD (H): Status: ACTIVE | Noted: 2017-02-06

## 2017-02-06 PROBLEM — F32.0 MAJOR DEPRESSIVE DISORDER, SINGLE EPISODE, MILD (H): Status: ACTIVE | Noted: 2017-01-31

## 2017-02-24 ENCOUNTER — MYC MEDICAL ADVICE (OUTPATIENT)
Dept: PEDIATRICS | Facility: CLINIC | Age: 57
End: 2017-02-24

## 2017-02-24 DIAGNOSIS — K21.9 GASTROESOPHAGEAL REFLUX DISEASE WITHOUT ESOPHAGITIS: Primary | ICD-10-CM

## 2017-02-27 ENCOUNTER — TELEPHONE (OUTPATIENT)
Dept: PEDIATRICS | Facility: CLINIC | Age: 57
End: 2017-02-27

## 2017-02-27 DIAGNOSIS — K21.9 GASTROESOPHAGEAL REFLUX DISEASE WITHOUT ESOPHAGITIS: ICD-10-CM

## 2017-02-27 RX ORDER — NICOTINE POLACRILEX 4 MG/1
20 GUM, CHEWING ORAL 2 TIMES DAILY
Qty: 180 TABLET | Refills: 3 | Status: SHIPPED | OUTPATIENT
Start: 2017-02-27 | End: 2017-03-21

## 2017-02-27 NOTE — TELEPHONE ENCOUNTER
Received notice from [facility] pharmacy [place] at [phone] that medication is not covered.     Medication: Omeprazole 20 MG  Insurance phone # : [phone]  Insurance ID: [id number]    Note written on fax from pharmacy: MAX 1 CAPSULE PER DAY PER INSURANCE. PLEASE CONTACT THEM FOR PRIOR AUTH.

## 2017-02-28 DIAGNOSIS — K21.9 GASTROESOPHAGEAL REFLUX DISEASE WITHOUT ESOPHAGITIS: ICD-10-CM

## 2017-03-10 RX ORDER — NICOTINE POLACRILEX 4 MG/1
20 GUM, CHEWING ORAL 2 TIMES DAILY
Qty: 180 TABLET | Refills: 3 | Status: CANCELLED | OUTPATIENT
Start: 2017-03-10

## 2017-03-13 NOTE — TELEPHONE ENCOUNTER
PA  For quantity limits completed and faxed back. Will await respond.  Donna QUINTERO, MIKAYLA,AAMA

## 2017-03-21 DIAGNOSIS — K21.9 GASTROESOPHAGEAL REFLUX DISEASE WITHOUT ESOPHAGITIS: ICD-10-CM

## 2017-03-21 RX ORDER — NICOTINE POLACRILEX 4 MG/1
20 GUM, CHEWING ORAL 2 TIMES DAILY
Qty: 180 TABLET | Refills: 0 | Status: SHIPPED | OUTPATIENT
Start: 2017-03-21 | End: 2017-03-23 | Stop reason: DRUGHIGH

## 2017-03-21 NOTE — TELEPHONE ENCOUNTER
Reason for Call:  Other prescription    Detailed comments: Pharmacy called, PA for Prilosec 40mg has been denied. They would like to have the prescription changed to 40mg once per day is possible. Please send a new prescription to the pharmacy if appropriate.    Phone Number Patient can be reached at: Home number on file [phone] (home)    Best Time: anytime    Can we leave a detailed message on this number? YES    Anjali Mooney,   [facility]

## 2017-03-23 RX ORDER — OMEPRAZOLE 40 MG/1
40 CAPSULE, DELAYED RELEASE ORAL DAILY
Qty: 90 CAPSULE | Refills: 1 | Status: SHIPPED | OUTPATIENT
Start: 2017-03-23 | End: 2017-11-08

## 2017-06-12 ENCOUNTER — MYC MEDICAL ADVICE (OUTPATIENT)
Dept: PEDIATRICS | Facility: CLINIC | Age: 57
End: 2017-06-12

## 2017-08-04 ENCOUNTER — HOSPITAL ENCOUNTER (OUTPATIENT)
Facility: CLINIC | Age: 57
Discharge: HOME | End: 2017-08-04
Attending: COLON & RECTAL SURGERY | Admitting: COLON & RECTAL SURGERY
Payer: COMMERCIAL

## 2017-08-04 ENCOUNTER — SURGERY (OUTPATIENT)
Age: 57
End: 2017-08-04

## 2017-08-04 VITALS
BODY MASS INDEX: 29.22 KG/M2 | SYSTOLIC BLOOD PRESSURE: 120 MMHG | DIASTOLIC BLOOD PRESSURE: 91 MMHG | RESPIRATION RATE: 9 BRPM | WEIGHT: 235 LBS | OXYGEN SATURATION: 97 % | HEIGHT: 75 IN

## 2017-08-04 LAB — COLONOSCOPY: NORMAL

## 2017-08-04 PROCEDURE — 25000128 H RX IP 250 OP 636: Performed by: COLON & RECTAL SURGERY

## 2017-08-04 PROCEDURE — 45380 COLONOSCOPY AND BIOPSY: CPT | Performed by: COLON & RECTAL SURGERY

## 2017-08-04 PROCEDURE — 88305 TISSUE EXAM BY PATHOLOGIST: CPT | Performed by: COLON & RECTAL SURGERY

## 2017-08-04 PROCEDURE — 88305 TISSUE EXAM BY PATHOLOGIST: CPT | Mod: 26 | Performed by: COLON & RECTAL SURGERY

## 2017-08-04 PROCEDURE — G0500 MOD SEDAT ENDO SERVICE >5YRS: HCPCS | Performed by: COLON & RECTAL SURGERY

## 2017-08-04 RX ORDER — FENTANYL CITRATE 50 UG/ML
INJECTION, SOLUTION INTRAMUSCULAR; INTRAVENOUS PRN
Status: DISCONTINUED | OUTPATIENT
Start: 2017-08-04 | End: 2017-08-04 | Stop reason: HOSPADM

## 2017-08-04 RX ORDER — ONDANSETRON 2 MG/ML
4 INJECTION INTRAMUSCULAR; INTRAVENOUS
Status: DISCONTINUED | OUTPATIENT
Start: 2017-08-04 | End: 2017-08-04 | Stop reason: HOSPADM

## 2017-08-04 RX ORDER — LIDOCAINE 40 MG/G
CREAM TOPICAL
Status: DISCONTINUED | OUTPATIENT
Start: 2017-08-04 | End: 2017-08-04 | Stop reason: HOSPADM

## 2017-08-04 RX ORDER — ONDANSETRON 4 MG/1
4 TABLET, ORALLY DISINTEGRATING ORAL EVERY 6 HOURS PRN
Status: CANCELLED | OUTPATIENT
Start: 2017-08-04

## 2017-08-04 RX ORDER — ONDANSETRON 2 MG/ML
INJECTION INTRAMUSCULAR; INTRAVENOUS PRN
Status: DISCONTINUED | OUTPATIENT
Start: 2017-08-04 | End: 2017-08-04 | Stop reason: HOSPADM

## 2017-08-04 RX ORDER — FLUMAZENIL 0.1 MG/ML
0.2 INJECTION, SOLUTION INTRAVENOUS
Status: CANCELLED | OUTPATIENT
Start: 2017-08-04 | End: 2017-08-05

## 2017-08-04 RX ORDER — ONDANSETRON 2 MG/ML
4 INJECTION INTRAMUSCULAR; INTRAVENOUS EVERY 6 HOURS PRN
Status: CANCELLED | OUTPATIENT
Start: 2017-08-04

## 2017-08-04 RX ORDER — NALOXONE HYDROCHLORIDE 0.4 MG/ML
.1-.4 INJECTION, SOLUTION INTRAMUSCULAR; INTRAVENOUS; SUBCUTANEOUS
Status: CANCELLED | OUTPATIENT
Start: 2017-08-04 | End: 2017-08-05

## 2017-08-04 RX ADMIN — FENTANYL CITRATE 100 MCG: 50 INJECTION, SOLUTION INTRAMUSCULAR; INTRAVENOUS at 12:39

## 2017-08-04 RX ADMIN — ONDANSETRON 4 MG: 2 INJECTION INTRAMUSCULAR; INTRAVENOUS at 12:35

## 2017-08-04 RX ADMIN — MIDAZOLAM HYDROCHLORIDE 2 MG: 1 INJECTION, SOLUTION INTRAMUSCULAR; INTRAVENOUS at 12:40

## 2017-08-04 NOTE — H&P
Pre-Endoscopy History and Physical     Sang Mtz MRN# [id number]   YOB: 1960 Age: 57 year old     Date of Procedure: 8/4/2017  Primary care provider: Jethro Villalba  Type of Endoscopy: Colonoscopy  Reason for Procedure: Family history of colon cancer  Type of Anesthesia Anticipated: Moderate Sedation    HPI:    Sang is a 57 year old male who will be undergoing the above procedure.      A history and physical has been performed. The patient's medications and allergies have been reviewed. The risks and benefits of the procedure and the sedation options and risks were discussed with the patient.  All questions were answered and informed consent was obtained.      He denies a personal or family history of anesthesia complications or bleeding disorders.     No Known Allergies       No current facility-administered medications on file prior to encounter.   Current Outpatient Prescriptions on File Prior to Encounter:  omeprazole (PRILOSEC) 40 MG capsule Take 1 capsule (40 mg) by mouth daily Take 30-60 minutes before a meal.   sildenafil (VIAGRA) 100 MG tablet Take 0.5-1 tablets by mouth daily as needed for erectile dysfunction.   ibuprofen (ADVIL,MOTRIN) 400-800 mg tablet Take 400-800 mg by mouth every 6 hours as needed.   ASPIRIN 81 MG OR TABS 1 TABLET DAILY   MULTI-VITAMIN OR TABS        Patient Active Problem List   Diagnosis     HYPERLIPIDEMIA LDL GOAL <130     Erectile dysfunction     Hypertriglyceridemia     Splenomegaly     Anxiety     Kidney stone     Liver nodule     Mass of lung     Esophageal reflux     ACP (advance care planning)     Major depressive disorder, single episode, mild (H)        Past Medical History:   Diagnosis Date     Depressive disorder      Hypertriglyceridemia 9/13/2010        Past Surgical History:   Procedure Laterality Date     COLONOSCOPY  8/3/2012    Procedure: COLONOSCOPY;  COLONOSCOPY;  Surgeon: Chandler Worthy MD;  Location:  GI      REMOVAL OF  "TONSILS,<11 Y/O         Social History   Substance Use Topics     Smoking status: Never Smoker     Smokeless tobacco: Never Used     Alcohol use No       Family History   Problem Relation Age of Onset     Hypertension Father      CANCER Father      cml     Cancer - colorectal Father      diagnosis at age 75     C.A.D. Maternal Grandfather      late     Eye Disorder No family hx of      no glaucoma       REVIEW OF SYSTEMS:     5 point ROS negative except as noted above in HPI, including Gen., Resp., CV, GI &  system review.      PHYSICAL EXAM:   BP (!) 147/104  Resp 29  Ht 1.905 m (6' 3\")  Wt 106.6 kg (235 lb)  SpO2 94%  BMI 29.37 kg/m2 Estimated body mass index is 29.37 kg/(m^2) as calculated from the following:    Height as of this encounter: 1.905 m (6' 3\").    Weight as of this encounter: 106.6 kg (235 lb).   GENERAL APPEARANCE: healthy and alert  MENTAL STATUS: alert  RESP: lungs clear to auscultation - no rales, rhonchi or wheezes  CV: regular rates and rhythm and normal S1 S2, no S3 or S4      IMPRESSION   ASA Class 2 - Mild systemic disease        PLAN:     Plan for colonoscopy. We discussed the risks, benefits and alternatives and the patient wished to proceed.    The above has been forwarded to the consulting provider.      Erickson Graves MD  [facility]  Phone: [phone]  August 4, 2017    "

## 2017-08-07 LAB — COPATH REPORT: NORMAL

## 2017-12-07 DIAGNOSIS — F32.0 MAJOR DEPRESSIVE DISORDER, SINGLE EPISODE, MILD (H): Primary | ICD-10-CM

## 2017-12-07 DIAGNOSIS — F43.0 ACUTE REACTION TO STRESS: ICD-10-CM

## 2017-12-12 RX ORDER — ESCITALOPRAM OXALATE 10 MG/1
TABLET ORAL
Qty: 30 TABLET | Refills: 1 | Status: SHIPPED | OUTPATIENT
Start: 2017-12-12 | End: 2018-01-16

## 2017-12-12 NOTE — TELEPHONE ENCOUNTER
patient has follow-up appointment next month, 1 year from last office visit. prescription for 30 days sent.

## 2017-12-12 NOTE — TELEPHONE ENCOUNTER
Routing refill request to provider for review/approval because:  Zelda given x1 and patient did not follow up, please advise    Patient has upcoming OV. PHQ-9 over due. Ok to send refill until seen? Please sign if appropriate.    PHQ-9 score:    PHQ-9 SCORE 1/31/2017   Total Score 0       Eva LARSON RN, BSN, PHN  Chichi Kidd RN

## 2018-01-03 DIAGNOSIS — K21.9 GASTROESOPHAGEAL REFLUX DISEASE WITHOUT ESOPHAGITIS: ICD-10-CM

## 2018-01-03 NOTE — TELEPHONE ENCOUNTER
Requested Prescriptions   Pending Prescriptions Disp Refills     omeprazole (PRILOSEC) 40 MG capsule [Pharmacy Med Name: OMEPRAZOLE  40MG  CAP]    Last Written Prescription Date:  11/10/2017  Last Fill Quantity: 90,  # refills: 0   Last Office Visit with [facility], [facility] or [facility] prescribing provider:  1/31/2017   Future Office Visit:    Next 5 appointments (look out 90 days)     Jan 16, 2018  2:00 PM CST   PHYSICAL with Jethro Villalba MD   [facility]    [address] [place] [id number] [phone]                  90 capsule      Sig: TAKE 1 CAPSULE BY MOUTH  DAILY TAKE 30-60 MINUTES  BEFORE A MEAL.    PPI Protocol Passed    1/3/2018  7:30 AM       Passed - Not on Clopidogrel (unless Pantoprazole ordered)       Passed - No diagnosis of osteoporosis on record       Passed - Recent or future visit with authorizing provider's specialty    Patient had office visit in the last year or has a visit in the next 30 days with authorizing provider.  See chart review.              Passed - Patient is age 18 or older

## 2018-01-04 RX ORDER — OMEPRAZOLE 40 MG/1
CAPSULE, DELAYED RELEASE ORAL
Qty: 90 CAPSULE | Refills: 0 | Status: SHIPPED | OUTPATIENT
Start: 2018-01-04 | End: 2018-01-16

## 2018-01-16 ENCOUNTER — OFFICE VISIT (OUTPATIENT)
Dept: PEDIATRICS | Facility: CLINIC | Age: 58
End: 2018-01-16
Payer: COMMERCIAL

## 2018-01-16 VITALS
WEIGHT: 259.1 LBS | SYSTOLIC BLOOD PRESSURE: 118 MMHG | BODY MASS INDEX: 32.22 KG/M2 | RESPIRATION RATE: 20 BRPM | DIASTOLIC BLOOD PRESSURE: 72 MMHG | TEMPERATURE: 98.3 F | HEART RATE: 100 BPM | HEIGHT: 75 IN

## 2018-01-16 DIAGNOSIS — Z86.0100 HISTORY OF COLONIC POLYPS: ICD-10-CM

## 2018-01-16 DIAGNOSIS — K21.9 GASTROESOPHAGEAL REFLUX DISEASE WITHOUT ESOPHAGITIS: ICD-10-CM

## 2018-01-16 DIAGNOSIS — F32.0 MAJOR DEPRESSIVE DISORDER, SINGLE EPISODE, MILD (H): ICD-10-CM

## 2018-01-16 DIAGNOSIS — Z00.00 ENCOUNTER FOR ROUTINE ADULT HEALTH EXAMINATION WITHOUT ABNORMAL FINDINGS: Primary | ICD-10-CM

## 2018-01-16 DIAGNOSIS — Z12.5 PROSTATE CANCER SCREENING: ICD-10-CM

## 2018-01-16 PROCEDURE — 99396 PREV VISIT EST AGE 40-64: CPT | Performed by: INTERNAL MEDICINE

## 2018-01-16 RX ORDER — ESCITALOPRAM OXALATE 10 MG/1
TABLET ORAL
Qty: 90 TABLET | Refills: 3 | Status: SHIPPED | OUTPATIENT
Start: 2018-01-16 | End: 2018-12-11

## 2018-01-16 RX ORDER — OMEPRAZOLE 40 MG/1
CAPSULE, DELAYED RELEASE ORAL
Qty: 90 CAPSULE | Refills: 0 | Status: SHIPPED | OUTPATIENT
Start: 2018-01-16 | End: 2018-03-31

## 2018-01-16 ASSESSMENT — ANXIETY QUESTIONNAIRES
3. WORRYING TOO MUCH ABOUT DIFFERENT THINGS: NOT AT ALL
IF YOU CHECKED OFF ANY PROBLEMS ON THIS QUESTIONNAIRE, HOW DIFFICULT HAVE THESE PROBLEMS MADE IT FOR YOU TO DO YOUR WORK, TAKE CARE OF THINGS AT HOME, OR GET ALONG WITH OTHER PEOPLE: NOT DIFFICULT AT ALL
5. BEING SO RESTLESS THAT IT IS HARD TO SIT STILL: NOT AT ALL
2. NOT BEING ABLE TO STOP OR CONTROL WORRYING: NOT AT ALL
1. FEELING NERVOUS, ANXIOUS, OR ON EDGE: NOT AT ALL
GAD7 TOTAL SCORE: 0
7. FEELING AFRAID AS IF SOMETHING AWFUL MIGHT HAPPEN: NOT AT ALL
6. BECOMING EASILY ANNOYED OR IRRITABLE: NOT AT ALL

## 2018-01-16 ASSESSMENT — PATIENT HEALTH QUESTIONNAIRE - PHQ9
SUM OF ALL RESPONSES TO PHQ QUESTIONS 1-9: 0
5. POOR APPETITE OR OVEREATING: NOT AT ALL

## 2018-01-16 NOTE — PATIENT INSTRUCTIONS
Preventive Health Recommendations  Male Ages 50   64    Yearly exam:             See your health care provider every year in order to  o   Review health changes.   o   Discuss preventive care.    o   Review your medicines if your doctor has prescribed any.     Have a cholesterol test every 5 years, or more frequently if you are at risk for high cholesterol/heart disease.     Have a diabetes test (fasting glucose) every three years. If you are at risk for diabetes, you should have this test more often.     Have a colonoscopy at age 50, or have a yearly FIT test (stool test). These exams will check for colon cancer.      Talk with your health care provider about whether or not a prostate cancer screening test (PSA) is right for you.    You should be tested each year for STDs (sexually transmitted diseases), if you re at risk.     Shots: Get a flu shot each year. Get a tetanus shot every 10 years.     Nutrition:    Eat at least 5 servings of fruits and vegetables daily.     Eat whole-grain bread, whole-wheat pasta and brown rice instead of white grains and rice.     Talk to your provider about Calcium and Vitamin D.     Lifestyle    Exercise for at least 150 minutes a week (30 minutes a day, 5 days a week). This will help you control your weight and prevent disease.     Limit alcohol to one drink per day.     No smoking.     Wear sunscreen to prevent skin cancer.     See your dentist every six months for an exam and cleaning.     See your eye doctor every 1 to 2 years.

## 2018-01-16 NOTE — MR AVS SNAPSHOT
After Visit Summary   1/16/2018    Sang Mtz    MRN: [id number]           Patient Information     Date Of Birth          1960        Visit Information        Provider Department      1/16/2018 2:00 PM Jethro Villalba MD [facility]        Today's Diagnoses     Encounter for routine adult health examination without abnormal findings    -  1    Gastroesophageal reflux disease without esophagitis        Major depressive disorder, single episode, mild (H)        History of colonic polyps        Prostate cancer screening          Care Instructions      Preventive Health Recommendations  Male Ages 50 - 64    Yearly exam:             See your health care provider every year in order to  o   Review health changes.   o   Discuss preventive care.    o   Review your medicines if your doctor has prescribed any.     Have a cholesterol test every 5 years, or more frequently if you are at risk for high cholesterol/heart disease.     Have a diabetes test (fasting glucose) every three years. If you are at risk for diabetes, you should have this test more often.     Have a colonoscopy at age 50, or have a yearly FIT test (stool test). These exams will check for colon cancer.      Talk with your health care provider about whether or not a prostate cancer screening test (PSA) is right for you.    You should be tested each year for STDs (sexually transmitted diseases), if you re at risk.     Shots: Get a flu shot each year. Get a tetanus shot every 10 years.     Nutrition:    Eat at least 5 servings of fruits and vegetables daily.     Eat whole-grain bread, whole-wheat pasta and brown rice instead of white grains and rice.     Talk to your provider about Calcium and Vitamin D.     Lifestyle    Exercise for at least 150 minutes a week (30 minutes a day, 5 days a week). This will help you control your weight and prevent disease.     Limit alcohol to one drink per day.     No smoking.     Wear  sunscreen to prevent skin cancer.     See your dentist every six months for an exam and cleaning.     See your eye doctor every 1 to 2 years.                  Follow-ups after your visit        Your next 10 appointments already scheduled     Jan 24, 2018  7:40 AM CST   LAB with [facility] LAB   [facility] [place] ([facility])    [address] [place] [address]  [address]  [place] [address] [phone]           Please do not eat 10-12 hours before your appointment if you are coming in fasting for labs on lipids, cholesterol, or glucose (sugar). This does not apply to pregnant women. Water, hot tea and black coffee (with nothing added) are okay. Do not drink other fluids, diet soda or chew gum.              Future tests that were ordered for you today     Open Future Orders        Priority Expected Expires Ordered    Lipid panel reflex to direct LDL Fasting Routine  4/16/2018 1/16/2018    Comprehensive metabolic panel (BMP + Alb, Alk Phos, ALT, AST, Total. Bili, TP) Routine  4/16/2018 1/16/2018    PSA, screen Routine  4/16/2018 1/16/2018            Who to contact     If you have questions or need follow up information about today's clinic visit or your schedule please contact [facility] directly at [phone].  Normal or non-critical lab and imaging results will be communicated to you by [employer], letter or phone within 4 business days after the clinic has received the results. If you do not hear from us within 7 days, please contact the clinic through [employer] or phone. If you have a critical or abnormal lab result, we will notify you by phone as soon as possible.  Submit refill requests through [employer] or call your pharmacy and they will forward the refill request to us. Please allow 3 business days for your refill to be completed.          Additional Information About Your Visit        [employer] Information     [employer] gives you secure access to your electronic health record. If you see a  "primary care provider, you can also send messages to your care team and make appointments. If you have questions, please call your primary care clinic.  If you do not have a primary care provider, please call [phone] and they will assist you.        Care EveryWhere ID     This is your Care EveryWhere ID. This could be used by other organizations to access your [facility] medical records  [id number]        Your Vitals Were     Pulse Temperature Respirations Height BMI (Body Mass Index)       100 98.3  F (36.8  C) (Oral) 20 6' 3.25\" (1.911 m) 32.17 kg/m2        Blood Pressure from Last 3 Encounters:   01/16/18 118/72   08/04/17 (!) 120/91   01/31/17 104/76    Weight from Last 3 Encounters:   01/16/18 259 lb 1.6 oz (117.5 kg)   08/04/17 235 lb (106.6 kg)   01/31/17 255 lb 12.8 oz (116 kg)                 Today's Medication Changes          These changes are accurate as of: 1/16/18  2:12 PM.  If you have any questions, ask your nurse or doctor.               These medicines have changed or have updated prescriptions.        Dose/Directions    escitalopram 10 MG tablet   Commonly known as:  LEXAPRO   This may have changed:  See the new instructions.   Used for:  Major depressive disorder, single episode, mild (H)   Changed by:  Jethro Villalba MD        TAKE 1 TABLET BY MOUTH  DAILY   Quantity:  90 tablet   Refills:  3       omeprazole 40 MG capsule   Commonly known as:  priLOSEC   This may have changed:  See the new instructions.   Used for:  Gastroesophageal reflux disease without esophagitis   Changed by:  Jethro Villalba MD        TAKE 1 CAPSULE BY MOUTH  DAILY TAKE 30-60 MINUTES  BEFORE A MEAL.   Quantity:  90 capsule   Refills:  0            Where to get your medicines      These medications were sent to [facility] - [address], [facility] [address]     Phone:  [phone]     escitalopram 10 MG tablet    omeprazole 40 MG capsule       "          Primary Care Provider Office Phone # Fax #    Jethro Villalba [phone] [address] [place] [address]        Equal Access to Services     ANABELLE JACOBOEDER : Cleo bessie cherry gabrielo Cherry, waaxda luqadaha, qaybta kaalmada parvin, jason aritasmitha helga. So [facility] [phone].    ATENCIÓN: Si habla español, tiene a enamorado disposición servicios gratuitos de asistencia lingüística. Llame al [phone].    We comply with applicable federal civil rights laws and [place] laws. We do not discriminate on the basis of race, color, national origin, age, disability, sex, sexual orientation, or gender identity.            Thank you!     Thank you for choosing [facility]  for your care. Our goal is always to provide you with excellent care. Hearing back from our patients is one way we can continue to improve our services. Please take a few minutes to complete the written survey that you may receive in the mail after your visit with us. Thank you!             Your Updated Medication List - Protect others around you: Learn how to safely use, store and throw away your medicines at www.disposemymeds.org.          This list is accurate as of: 1/16/18  2:12 PM.  Always use your most recent med list.                   Brand Name Dispense Instructions for use Diagnosis    aspirin 81 MG tablet      1 TABLET DAILY        escitalopram 10 MG tablet    LEXAPRO    90 tablet    TAKE 1 TABLET BY MOUTH  DAILY    Major depressive disorder, single episode, mild (H)       ibuprofen 400-800 mg tablet    ADVIL,MOTRIN     Take 400-800 mg by mouth every 6 hours as needed.        Multi-vitamin Tabs tablet   Generic drug:  multivitamin, therapeutic with minerals           omeprazole 40 MG capsule    priLOSEC    90 capsule    TAKE 1 CAPSULE BY MOUTH  DAILY TAKE 30-60 MINUTES  BEFORE A MEAL.    Gastroesophageal reflux disease without esophagitis       sildenafil 100 MG tablet     VIAGRA    3 tablet    Take 0.5-1 tablets by mouth daily as needed for erectile dysfunction.    Erectile dysfunction

## 2018-01-16 NOTE — NURSING NOTE
"Chief Complaint   Patient presents with     Physical       Initial /72  Pulse 100  Temp 98.3  F (36.8  C) (Oral)  Resp 20  Ht 6' 3.25\" (1.911 m)  Wt 259 lb 1.6 oz (117.5 kg)  BMI 32.17 kg/m2 Estimated body mass index is 32.17 kg/(m^2) as calculated from the following:    Height as of this encounter: 6' 3.25\" (1.911 m).    Weight as of this encounter: 259 lb 1.6 oz (117.5 kg).  Medication Reconciliation: complete   Donna QUINTERO, MIKAYLA,AAMA      "

## 2018-01-16 NOTE — PROGRESS NOTES
SUBJECTIVE:   CC: Sang Mtz is an 57 year old male who presents for preventative health visit.     Physical   Annual:     Getting at least 3 servings of Calcium per day::  NO    Bi-annual eye exam::  Yes    Dental care twice a year::  Yes    Sleep apnea or symptoms of sleep apnea::  None    Diet::  Regular (no restrictions)    Frequency of exercise::  4-5 days/week    Duration of exercise::  45-60 minutes    Taking medications regularly::  Yes    Medication side effects::  None    Additional concerns today::  No            Patient here for [facility] exma, doing very well. Mood has been good and patient has no concerns today. Taking PPI daily and works well. no side effects noted. symptoms are not severe.     Today's PHQ-2 Score: PHQ-2 ( 1999 Pfizer) 1/13/2018   Q1: Little interest or pleasure in doing things 0   Q2: Feeling down, depressed or hopeless 0   PHQ-2 Score 0   Q1: Little interest or pleasure in doing things Not at all   Q2: Feeling down, depressed or hopeless Not at all   PHQ-2 Score 0       Abuse: Current or Past(Physical, Sexual or Emotional)- No  Do you feel safe in your environment - Yes    Social History   Substance Use Topics     Smoking status: Never Smoker     Smokeless tobacco: Never Used     Alcohol use No     Alcohol Use 1/13/2018   If you drink alcohol, do you typically have greater than 3 drinks per day OR greater than 7 drinks per week?   Not applicable       Last PSA:   PSA   Date Value Ref Range Status   01/31/2017 1.49 0 - 4 ug/L Final     Comment:     Assay Method:  Chemiluminescence using Siemens Vista analyzer       Reviewed orders with patient. Reviewed health maintenance and updated orders accordingly - Yes  BP Readings from Last 3 Encounters:   01/16/18 118/72   08/04/17 (!) 120/91   01/31/17 104/76    Wt Readings from Last 3 Encounters:   01/16/18 259 lb 1.6 oz (117.5 kg)   08/04/17 235 lb (106.6 kg)   01/31/17 255 lb 12.8 oz (116 kg)                    Reviewed and updated as  "needed this visit by clinical staff         Reviewed and updated as needed this visit by Provider            Review of Systems  C: NEGATIVE for fever, chills, change in weight  I: NEGATIVE for worrisome rashes, moles or lesions  E: NEGATIVE for vision changes or irritation  ENT: NEGATIVE for ear, mouth and throat problems  R: NEGATIVE for significant cough or SOB  CV: NEGATIVE for chest pain, palpitations or peripheral edema  GI: NEGATIVE for nausea, abdominal pain, heartburn, or change in bowel habits   male: negative for dysuria, hematuria, decreased urinary stream, erectile dysfunction, urethral discharge  M: NEGATIVE for significant arthralgias or myalgia  N: NEGATIVE for weakness, dizziness or paresthesias  P: NEGATIVE for changes in mood or affect    OBJECTIVE:   /72  Pulse 100  Temp 98.3  F (36.8  C) (Oral)  Resp 20  Ht 6' 3.25\" (1.911 m)  Wt 259 lb 1.6 oz (117.5 kg)  BMI 32.17 kg/m2    Wt Readings from Last 4 Encounters:   01/16/18 259 lb 1.6 oz (117.5 kg)   08/04/17 235 lb (106.6 kg)   01/31/17 255 lb 12.8 oz (116 kg)   01/03/17 250 lb (113.4 kg)         Physical Exam  GENERAL: healthy, alert and no distress  EYES: Eyes grossly normal to inspection, PERRL and conjunctivae and sclerae normal  HENT: ear canals and TM's normal, nose and mouth without ulcers or lesions  NECK: no adenopathy, no asymmetry, masses, or scars and thyroid normal to palpation  RESP: lungs clear to auscultation - no rales, rhonchi or wheezes  CV: regular rate and rhythm, normal S1 S2, no S3 or S4, no murmur, click or rub, no peripheral edema and peripheral pulses strong  ABDOMEN: soft, nontender, no hepatosplenomegaly, no masses and bowel sounds normal  MS: no gross musculoskeletal defects noted, no edema  SKIN: no suspicious lesions or rashes  NEURO: Normal strength and tone, mentation intact and speech normal  PSYCH: mentation appears normal, affect normal/bright    ASSESSMENT/PLAN:   1. Encounter for routine adult " "health examination without abnormal findings  d/w pt preventative care measures including seat belt use, bike helmet, moderation of EtOH, avoiding tobacco, avoiding excessive sun exposure/sunscreen, wt management or wt loss if BMI > 30, need to screen for lipid disorders, mood disorders, CAD risk factors, etc. Also discussed accident prevention and future RHM schedule.   - Lipid panel reflex to direct LDL Fasting; Future  - Comprehensive metabolic panel (BMP + Alb, Alk Phos, ALT, AST, Total. Bili, TP); Future  - PSA, screen; Future    2. Gastroesophageal reflux disease without esophagitis  Well controlled on current medication(s). discussed with patient (or patient's parents/caregiver) pathophysiology of condition and treatment options.  reviewed labs, medications, diet ,etc.  concisder EGD when due for next colonosocpy if still taking PPI daily. Patient verbalized understanding and is agreeable to this plan.   - omeprazole (PRILOSEC) 40 MG capsule; TAKE 1 CAPSULE BY MOUTH  DAILY TAKE 30-60 MINUTES  BEFORE A MEAL.  Dispense: 90 capsule; Refill: 0    3. Major depressive disorder, single episode, mild (H)  Well controlled on current medication(s).   - escitalopram (LEXAPRO) 10 MG tablet; TAKE 1 TABLET BY MOUTH  DAILY  Dispense: 90 tablet; Refill: 3    4. History of colonic polyps  discussed with patient (or patient's parents/caregiver) pathophysiology of condition and treatment options.  reviwed guidelines and reccs.     5. Prostate cancer screening  - PSA, screen; Future    COUNSELING:   Reviewed preventive health counseling, as reflected in patient instructions         reports that he has never smoked. He has never used smokeless tobacco.    Estimated body mass index is 29.37 kg/(m^2) as calculated from the following:    Height as of 8/4/17: 6' 3\" (1.905 m).    Weight as of 8/4/17: 235 lb (106.6 kg).   Weight management plan: Discussed healthy diet and exercise guidelines and patient will follow up in 12 months in " clinic to re-evaluate.    Counseling Resources:  ATP IV Guidelines  Pooled Cohorts Equation Calculator  FRAX Risk Assessment  ICSI Preventive Guidelines  Dietary Guidelines for Americans, 2010  USDA's MyPlate  ASA Prophylaxis  Lung CA Screening    I have discussed with patient the risks, benefits, medications, treatment options and modalities.   I have instructed the patient to call or schedule a follow-up appointment if any problems or failure to improve.     Jethro Villalba MD  [facility]        Answers for HPI/ROS submitted by the patient on 1/13/2018   PHQ-2 Score: 0

## 2018-01-17 ASSESSMENT — ANXIETY QUESTIONNAIRES: GAD7 TOTAL SCORE: 0

## 2018-01-24 DIAGNOSIS — Z12.5 PROSTATE CANCER SCREENING: ICD-10-CM

## 2018-01-24 DIAGNOSIS — Z00.00 ENCOUNTER FOR ROUTINE ADULT HEALTH EXAMINATION WITHOUT ABNORMAL FINDINGS: ICD-10-CM

## 2018-01-24 LAB
ALBUMIN SERPL-MCNC: 4 G/DL (ref 3.4–5)
ALP SERPL-CCNC: 91 U/L (ref 40–150)
ALT SERPL W P-5'-P-CCNC: 38 U/L (ref 0–70)
ANION GAP SERPL CALCULATED.3IONS-SCNC: 5 MMOL/L (ref 3–14)
AST SERPL W P-5'-P-CCNC: 20 U/L (ref 0–45)
BILIRUB SERPL-MCNC: 0.7 MG/DL (ref 0.2–1.3)
BUN SERPL-MCNC: 24 MG/DL (ref 7–30)
CALCIUM SERPL-MCNC: 8.9 MG/DL (ref 8.5–10.1)
CHLORIDE SERPL-SCNC: 108 MMOL/L (ref 94–109)
CHOLEST SERPL-MCNC: 157 MG/DL
CO2 SERPL-SCNC: 30 MMOL/L (ref 20–32)
CREAT SERPL-MCNC: 0.99 MG/DL (ref 0.66–1.25)
GFR SERPL CREATININE-BSD FRML MDRD: 77 ML/MIN/1.7M2
GLUCOSE SERPL-MCNC: 102 MG/DL (ref 70–99)
HDLC SERPL-MCNC: 43 MG/DL
LDLC SERPL CALC-MCNC: 83 MG/DL
NONHDLC SERPL-MCNC: 114 MG/DL
POTASSIUM SERPL-SCNC: 4.2 MMOL/L (ref 3.4–5.3)
PROT SERPL-MCNC: 6.8 G/DL (ref 6.8–8.8)
PSA SERPL-ACNC: 1.49 UG/L (ref 0–4)
SODIUM SERPL-SCNC: 143 MMOL/L (ref 133–144)
TRIGL SERPL-MCNC: 154 MG/DL

## 2018-01-24 PROCEDURE — 80053 COMPREHEN METABOLIC PANEL: CPT | Performed by: INTERNAL MEDICINE

## 2018-01-24 PROCEDURE — 80061 LIPID PANEL: CPT | Performed by: INTERNAL MEDICINE

## 2018-01-24 PROCEDURE — 36415 COLL VENOUS BLD VENIPUNCTURE: CPT | Performed by: INTERNAL MEDICINE

## 2018-01-24 PROCEDURE — G0103 PSA SCREENING: HCPCS | Performed by: INTERNAL MEDICINE

## 2018-03-31 DIAGNOSIS — K21.9 GASTROESOPHAGEAL REFLUX DISEASE WITHOUT ESOPHAGITIS: ICD-10-CM

## 2018-04-01 NOTE — TELEPHONE ENCOUNTER
"Requested Prescriptions   Pending Prescriptions Disp Refills     omeprazole (PRILOSEC) 40 MG capsule [Pharmacy Med Name: OMEPRAZOLE  40MG  CAP]  Last Written Prescription Date:  01/16/2018  Last Fill Quantity: 90 capsule,  # refills: 0   Last office visit: 1/16/2018 with prescribing provider:  Jethro Villalba MD    Future Office Visit:     90 capsule      Sig: TAKE 1 CAPSULE BY MOUTH  DAILY TAKE 30-60 MINUTES  BEFORE A MEAL.    PPI Protocol Passed    3/31/2018  8:50 PM       Passed - Not on Clopidogrel (unless Pantoprazole ordered)       Passed - No diagnosis of osteoporosis on record       Passed - Recent (12 mo) or future (30 days) visit within the authorizing provider's specialty    Patient had office visit in the last 12 months or has a visit in the next 30 days with authorizing provider or within the authorizing provider's specialty.  See \"Patient Info\" tab in inbasket, or \"Choose Columns\" in Meds & Orders section of the refill encounter.           Passed - Patient is age 18 or older          "

## 2018-04-03 RX ORDER — OMEPRAZOLE 40 MG/1
CAPSULE, DELAYED RELEASE ORAL
Qty: 90 CAPSULE | Refills: 1 | Status: SHIPPED | OUTPATIENT
Start: 2018-04-03 | End: 2018-09-17

## 2018-04-03 NOTE — TELEPHONE ENCOUNTER
Prescription approved per [facility] Refill Protocol.    Eva LARSON RN, BSN, PHN  [place] [facility] RN

## 2018-06-25 ENCOUNTER — OFFICE VISIT (OUTPATIENT)
Dept: PEDIATRICS | Facility: CLINIC | Age: 58
End: 2018-06-25
Payer: COMMERCIAL

## 2018-06-25 ENCOUNTER — RADIANT APPOINTMENT (OUTPATIENT)
Dept: GENERAL RADIOLOGY | Facility: CLINIC | Age: 58
End: 2018-06-25
Attending: PHYSICIAN ASSISTANT
Payer: COMMERCIAL

## 2018-06-25 VITALS
BODY MASS INDEX: 32.29 KG/M2 | SYSTOLIC BLOOD PRESSURE: 132 MMHG | HEART RATE: 103 BPM | OXYGEN SATURATION: 95 % | HEIGHT: 75 IN | TEMPERATURE: 98 F | DIASTOLIC BLOOD PRESSURE: 72 MMHG | WEIGHT: 259.7 LBS

## 2018-06-25 DIAGNOSIS — R05.9 COUGH: ICD-10-CM

## 2018-06-25 DIAGNOSIS — J01.90 ACUTE SINUSITIS WITH SYMPTOMS > 10 DAYS: ICD-10-CM

## 2018-06-25 DIAGNOSIS — H65.03 BILATERAL ACUTE SEROUS OTITIS MEDIA, RECURRENCE NOT SPECIFIED: Primary | ICD-10-CM

## 2018-06-25 DIAGNOSIS — H61.21 IMPACTED CERUMEN OF RIGHT EAR: ICD-10-CM

## 2018-06-25 PROCEDURE — 71046 X-RAY EXAM CHEST 2 VIEWS: CPT

## 2018-06-25 PROCEDURE — 99214 OFFICE O/P EST MOD 30 MIN: CPT | Performed by: PHYSICIAN ASSISTANT

## 2018-06-25 NOTE — PROGRESS NOTES
"  SUBJECTIVE:   Sang Mtz is a 57 year old male who presents to clinic today for the following health issues:    Acute Illness   Acute illness concerns: cough  Onset: 1 week ago    Fever: no    Chills/Sweats: YES- sweating    Headache (location?): no    Sinus Pressure:YES- post-nasal drainage    Conjunctivitis:  no    Ear Pain: YES: bilateral    Rhinorrhea: YES- green/clear    Congestion: YES- nasal     Sore Throat: no     Cough: YES-non-productive, productive of green sputum    Wheeze: YES- slight    Decreased Appetite: no    Nausea: no    Vomiting: no    Diarrhea:  no    Dysuria/Freq.: no    Fatigue/Achiness: YES- fatigue    Sick/Strep Exposure: no     Therapies Tried and outcome: Nyquil, Dayquil    ROS:  ROS otherwise negative    OBJECTIVE:                                                    /72 (BP Location: Right arm, Cuff Size: Adult Large)  Pulse 103  Temp 98  F (36.7  C) (Oral)  Ht 6' 3\" (1.905 m)  Wt 259 lb 11.2 oz (117.8 kg)  SpO2 95%  BMI 32.46 kg/m2  Body mass index is 32.46 kg/(m^2).   GENERAL: alert, no distress  HENT: ear canals- cerumen impaction on right; resolved with irrigation; TMs- erythemic and bulging bilaterally; Nose- normal; Mouth- no ulcers, no lesions  NECK: no tenderness, no adenopathy  RESP: rales in lower lobes bilaterallyl - no rales, no rhonchi, no wheezes  CV: regular rates and rhythm, normal S1 S2, no S3 or S4 and no murmur, no click or rub    Diagnostic test results:  Results for orders placed or performed in visit on 06/25/18 (from the past 24 hour(s))   XR Chest 2 Views    Narrative    CHEST TWO VIEWS  6/25/2018 1:57 PM     HISTORY: 57-year-old with history of cough.       Impression    IMPRESSION: Since CT exam on February 23, 2015, heart size remains  normal. Minimal scattered bibasilar opacities, most suggestive of  atelectasis or scar formation. No pleural effusion or pneumothorax.        ASSESSMENT/PLAN:                                                  "   (H65.03) Bilateral acute serous otitis media, recurrence not specified  (primary encounter diagnosis)  Comment: begin antibiotics. Increase fluid intake.  Plan: amoxicillin-clavulanate (AUGMENTIN) 875-125 MG         per tablet          (J01.90) Acute sinusitis with symptoms > 10 days  Comment:   Plan: amoxicillin-clavulanate (AUGMENTIN) 875-125 MG         per tablet          (R05) Cough  Comment: continue to monitor and return if symptoms persist or worsen.  Plan: XR Chest 2 Views          (H61.21) Impacted cerumen of right ear  Comment: resolved  Plan:     Raffy Lucio PA-C  [facility]

## 2018-06-25 NOTE — MR AVS SNAPSHOT
After Visit Summary   6/25/2018    Sang Mtz    MRN: [id number]           Patient Information     Date Of Birth          1960        Visit Information        Provider Department      6/25/2018 2:10 PM Raffy Lucio PA-C [facility]        Today's Diagnoses     Cough    -  1    Bilateral acute serous otitis media, recurrence not specified        Acute sinusitis with symptoms > 10 days          Care Instructions    Drink lots of fluids  Take antibiotics with food            Follow-ups after your visit        Who to contact     If you have questions or need follow up information about today's clinic visit or your schedule please contact [facility] directly at [phone].  Normal or non-critical lab and imaging results will be communicated to you by [employer], letter or phone within 4 business days after the clinic has received the results. If you do not hear from us within 7 days, please contact the clinic through [employer] or phone. If you have a critical or abnormal lab result, we will notify you by phone as soon as possible.  Submit refill requests through Ontodia or call your pharmacy and they will forward the refill request to us. Please allow 3 business days for your refill to be completed.          Additional Information About Your Visit        MyChart Information     Ontodia gives you secure access to your electronic health record. If you see a primary care provider, you can also send messages to your care team and make appointments. If you have questions, please call your primary care clinic.  If you do not have a primary care provider, please call [phone] and they will assist you.        Care EveryWhere ID     This is your Care EveryWhere ID. This could be used by other organizations to access your [place] medical records  [id number]        Your Vitals Were     Pulse Temperature Height Pulse Oximetry BMI (Body Mass Index)       103 98  F  "(36.7  C) (Oral) 6' 3\" (1.905 m) 95% 32.46 kg/m2        Blood Pressure from Last 3 Encounters:   06/25/18 132/72   01/16/18 118/72   08/04/17 (!) 120/91    Weight from Last 3 Encounters:   06/25/18 259 lb 11.2 oz (117.8 kg)   01/16/18 259 lb 1.6 oz (117.5 kg)   08/04/17 235 lb (106.6 kg)                 Today's Medication Changes          These changes are accurate as of 6/25/18  2:16 PM.  If you have any questions, ask your nurse or doctor.               Start taking these medicines.        Dose/Directions    amoxicillin-clavulanate 875-125 MG per tablet   Commonly known as:  AUGMENTIN   Used for:  Bilateral acute serous otitis media, recurrence not specified, Acute sinusitis with symptoms > 10 days   Started by:  Raffy Lucio PA-C        Dose:  1 tablet   Take 1 tablet by mouth 2 times daily   Quantity:  20 tablet   Refills:  0            Where to get your medicines      These medications were sent to [facility] [place] [facility] [place] - [address]   [address], [place] [address]     Phone:  [phone]     amoxicillin-clavulanate 875-125 MG per tablet                Primary Care Provider Office Phone # Fax #    Jethro Villalba [phone]       [address] [place] [address]        Equal Access to Services     [facility] AH: Hadii bessie cherry hadasho Soomaali, waaxda luqadaha, qaybta kaalmada adeegyada, waxay jesus carbajal ademalick partida. So [facility] [phone].    ATENCIÓN: Si habla español, tiene a enamorado disposición servicios gratuitos de asistencia lingüística. Llame al [phone].    We comply with applicable federal civil rights laws and [place] laws. We do not discriminate on the basis of race, color, national origin, age, disability, sex, sexual orientation, or gender identity.            Thank you!     Thank you for choosing [facility]  for your care. Our goal is always to provide you with excellent " care. Hearing back from our patients is one way we can continue to improve our services. Please take a few minutes to complete the written survey that you may receive in the mail after your visit with us. Thank you!             Your Updated Medication List - Protect others around you: Learn how to safely use, store and throw away your medicines at www.disposemymeds.org.          This list is accurate as of 6/25/18  2:16 PM.  Always use your most recent med list.                   Brand Name Dispense Instructions for use Diagnosis    amoxicillin-clavulanate 875-125 MG per tablet    AUGMENTIN    20 tablet    Take 1 tablet by mouth 2 times daily    Bilateral acute serous otitis media, recurrence not specified, Acute sinusitis with symptoms > 10 days       aspirin 81 MG tablet      1 TABLET DAILY        escitalopram 10 MG tablet    LEXAPRO    90 tablet    TAKE 1 TABLET BY MOUTH  DAILY    Major depressive disorder, single episode, mild (H)       ibuprofen 400-800 mg tablet    ADVIL,MOTRIN     Take 400-800 mg by mouth every 6 hours as needed.        Multi-vitamin Tabs tablet   Generic drug:  multivitamin, therapeutic with minerals           omeprazole 40 MG capsule    priLOSEC    90 capsule    TAKE 1 CAPSULE BY MOUTH  DAILY TAKE 30-60 MINUTES  BEFORE A MEAL.    Gastroesophageal reflux disease without esophagitis       sildenafil 100 MG tablet    VIAGRA    3 tablet    Take 0.5-1 tablets by mouth daily as needed for erectile dysfunction.    Erectile dysfunction

## 2018-07-21 ENCOUNTER — NURSE TRIAGE (OUTPATIENT)
Dept: NURSING | Facility: CLINIC | Age: 58
End: 2018-07-21

## 2018-07-22 ENCOUNTER — OFFICE VISIT (OUTPATIENT)
Dept: URGENT CARE | Facility: URGENT CARE | Age: 58
End: 2018-07-22
Payer: COMMERCIAL

## 2018-07-22 VITALS
HEIGHT: 75 IN | WEIGHT: 260 LBS | OXYGEN SATURATION: 96 % | SYSTOLIC BLOOD PRESSURE: 122 MMHG | TEMPERATURE: 98.1 F | DIASTOLIC BLOOD PRESSURE: 60 MMHG | HEART RATE: 83 BPM | BODY MASS INDEX: 32.33 KG/M2

## 2018-07-22 DIAGNOSIS — H92.02 EAR DISCOMFORT, LEFT: Primary | ICD-10-CM

## 2018-07-22 PROCEDURE — 99213 OFFICE O/P EST LOW 20 MIN: CPT | Performed by: FAMILY MEDICINE

## 2018-07-22 NOTE — PROGRESS NOTES
"SUBJECTIVE:   Sang Mtz is a 57 year old male presenting with a chief complaint of left ear discomfort (not pain) Patient was recently on a plane trip and he tends to wear ear buds in the left ear. .  There has been mildly decreased left ear hearing.  No balance problems.  No dizziness.  No discharge.    Onset of symptoms was a few days ago.  Course of illness is still present. .      Current and Associated symptoms: as listed above.  Treatment measures tried include none. .    Past Medical History:   Diagnosis Date     Depressive disorder      Hypertriglyceridemia 9/13/2010     Current Outpatient Prescriptions   Medication Sig Dispense Refill     escitalopram (LEXAPRO) 10 MG tablet TAKE 1 TABLET BY MOUTH  DAILY (Patient taking differently: 5 mg TAKE 1 TABLET BY MOUTH  DAILY) 90 tablet 3     MULTI-VITAMIN OR TABS        omeprazole (PRILOSEC) 40 MG capsule TAKE 1 CAPSULE BY MOUTH  DAILY TAKE 30-60 MINUTES  BEFORE A MEAL. 90 capsule 1     sildenafil (VIAGRA) 100 MG tablet Take 0.5-1 tablets by mouth daily as needed for erectile dysfunction. 3 tablet 5     ASPIRIN 81 MG OR TABS 1 TABLET DAILY       ibuprofen (ADVIL,MOTRIN) 400-800 mg tablet Take 400-800 mg by mouth every 6 hours as needed.       Social History   Substance Use Topics     Smoking status: Never Smoker     Smokeless tobacco: Never Used     Alcohol use No       ROS:  Review of systems negative except as stated above.    OBJECTIVE:  /60 (BP Location: Right arm, Patient Position: Chair, Cuff Size: Adult Regular)  Pulse 83  Temp 98.1  F (36.7  C) (Oral)  Ht 6' 3\" (1.905 m)  Wt 260 lb (117.9 kg)  SpO2 96%  BMI 32.5 kg/m2  GENERAL APPEARANCE: healthy, alert and no distress  HENT: ear canals and TM's normal.  No pain with movement of the left auricle.  Nose and mouth without ulcers, erythema or lesions.  The teeth and gums and palate and buccal mucosa are within normal limits   NECK: supple, nontender, no lymphadenopathy    ASSESSMENT:  Left " Ear Discomfort.  No clear etiology was identified.      PLAN:  follow up with the primary care provider if not better in 10-14 days if the symptoms fail to resolve. .     Omero Sesay MD

## 2018-07-22 NOTE — MR AVS SNAPSHOT
"              After Visit Summary   7/22/2018    Sang Mtz    MRN: [id number]           Patient Information     Date Of Birth          1960        Visit Information        Provider Department      7/22/2018 10:15 AM Omero Sesay MD [facility]        Care Instructions    follow up with your primary care provider if not better in 10-14 days.                    Follow-ups after your visit        Who to contact     If you have questions or need follow up information about today's clinic visit or your schedule please contact [facility] directly at [phone].  Normal or non-critical lab and imaging results will be communicated to you by [employer], letter or phone within 4 business days after the clinic has received the results. If you do not hear from us within 7 days, please contact the clinic through Adomost or phone. If you have a critical or abnormal lab result, we will notify you by phone as soon as possible.  Submit refill requests through Modelinia or call your pharmacy and they will forward the refill request to us. Please allow 3 business days for your refill to be completed.          Additional Information About Your Visit        MyChart Information     Model[employer] gives you secure access to your electronic health record. If you see a primary care provider, you can also send messages to your care team and make appointments. If you have questions, please call your primary care clinic.  If you do not have a primary care provider, please call [phone] and they will assist you.        Care EveryWhere ID     This is your Care EveryWhere ID. This could be used by other organizations to access your [place] medical records  [id number]        Your Vitals Were     Pulse Temperature Height Pulse Oximetry BMI (Body Mass Index)       83 98.1  F (36.7  C) (Oral) 6' 3\" (1.905 m) 96% 32.5 kg/m2        Blood Pressure from Last 3 Encounters:   07/22/18 122/60   06/25/18 132/72 "   01/16/18 118/72    Weight from Last 3 Encounters:   07/22/18 260 lb (117.9 kg)   06/25/18 259 lb 11.2 oz (117.8 kg)   01/16/18 259 lb 1.6 oz (117.5 kg)              Today, you had the following     No orders found for display         Today's Medication Changes          These changes are accurate as of 7/22/18 10:32 AM.  If you have any questions, ask your nurse or doctor.               These medicines have changed or have updated prescriptions.        Dose/Directions    escitalopram 10 MG tablet   Commonly known as:  LEXAPRO   This may have changed:    - how much to take  - additional instructions   Used for:  Major depressive disorder, single episode, mild (H)        TAKE 1 TABLET BY MOUTH  DAILY   Quantity:  90 tablet   Refills:  3                Primary Care Provider Office Phone # Fax #    Jethro Villalba [phone] [address] [place] [address]        Equal Access to Services     [facility]: Hadii bessie cherry hadasho Sotodd, waaxda luqadaha, qaybta kaalmada adeegyada, jason ramirez . So [facility] [phone].    ATENCIÓN: Si habla español, tiene a enamorado disposición servicios gratuitos de asistencia lingüística. Llame al [phone].    We comply with applicable federal civil rights laws and [place] laws. We do not discriminate on the basis of race, color, national origin, age, disability, sex, sexual orientation, or gender identity.            Thank you!     Thank you for choosing [facility] [place] [facility]  for your care. Our goal is always to provide you with excellent care. Hearing back from our patients is one way we can continue to improve our services. Please take a few minutes to complete the written survey that you may receive in the mail after your visit with us. Thank you!             Your Updated Medication List - Protect others around you: Learn how to safely use, store and throw away your medicines at www.disposemymeds.org.           This list is accurate as of 7/22/18 10:32 AM.  Always use your most recent med list.                   Brand Name Dispense Instructions for use Diagnosis    aspirin 81 MG tablet      1 TABLET DAILY        escitalopram 10 MG tablet    LEXAPRO    90 tablet    TAKE 1 TABLET BY MOUTH  DAILY    Major depressive disorder, single episode, mild (H)       ibuprofen 400-800 mg tablet    ADVIL,MOTRIN     Take 400-800 mg by mouth every 6 hours as needed.        Multi-vitamin Tabs tablet   Generic drug:  multivitamin, therapeutic with minerals           omeprazole 40 MG capsule    priLOSEC    90 capsule    TAKE 1 CAPSULE BY MOUTH  DAILY TAKE 30-60 MINUTES  BEFORE A MEAL.    Gastroesophageal reflux disease without esophagitis       sildenafil 100 MG tablet    VIAGRA    3 tablet    Take 0.5-1 tablets by mouth daily as needed for erectile dysfunction.    Erectile dysfunction

## 2018-07-22 NOTE — TELEPHONE ENCOUNTER
Caller  Has complelted treatment for OM and sinus infection and ear still hurts; requesting more antibiotic  Triage protocol reviewed  Advised in home care   Advised to be seen in  tomorrow for futhter evalutation   Understands and will comply    Myla Arana RN  [facility]      Reason for Disposition    Earache  (Exceptions: brief ear pain of < 60 minutes duration, earache occurring during air travel    Additional Information    Negative: Moving the earlobe or touching the ear clearly increases the pain    Negative: Foreign body struck in the ear (e.g., bug, piece of cotton)    Negative: Followed an ear injury    Negative: [1] Recently diagnosed with otitis media AND [2] currently on oral antibiotics    Negative: [1] Stiff neck (unable to touch chin to chest) AND [2] fever    Negative: [1] Bony area of skull behind the ear is pink or swollen AND [2] fever    Negative: Fever > 104 F (40 C)    Negative: Patient sounds very sick or weak to the triager    Negative: [1] SEVERE pain AND [2] not improved 2 hours after taking analgesic medication (e.g., ibuprofen or acetaminophen)    Negative: Walking is very unsteady    Negative: Sudden onset of ear pain after long - thin object was inserted into the ear canal (e.g., pencil, Q-tip)    Negative: Diabetes mellitus or weak immune system (e.g., HIV positive, cancer chemo, splenectomy, organ transplant, chronic steroids)    Negative: Recent onset of blurred vision    Negative: White, yellow, or green discharge    Negative: Bloody discharge or unexplained bleeding from ear canal    Protocols used: EARACHE-ADULT-

## 2018-07-22 NOTE — NURSING NOTE
"Sang Mtz;   Chief Complaint   Patient presents with     Ear Problem     c/o left ear pain onset a few days ago, recently on airplane, treated for ear infection and sinusitis with augmentin on 6/25/18      Urgent Care     Initial /60 (BP Location: Right arm, Patient Position: Chair, Cuff Size: Adult Regular)  Pulse 83  Temp 98.1  F (36.7  C) (Oral)  Ht 6' 3\" (1.905 m)  Wt 260 lb (117.9 kg)  SpO2 96%  BMI 32.5 kg/m2 Estimated body mass index is 32.5 kg/(m^2) as calculated from the following:    Height as of this encounter: 6' 3\" (1.905 m).    Weight as of this encounter: 260 lb (117.9 kg)..  BP completed using cuff size large.  Marianne Maria R.N.  "

## 2018-09-17 DIAGNOSIS — K21.9 GASTROESOPHAGEAL REFLUX DISEASE WITHOUT ESOPHAGITIS: ICD-10-CM

## 2018-09-18 RX ORDER — OMEPRAZOLE 40 MG/1
CAPSULE, DELAYED RELEASE ORAL
Qty: 90 CAPSULE | Refills: 2 | Status: SHIPPED | OUTPATIENT
Start: 2018-09-18 | End: 2019-05-17

## 2018-09-18 NOTE — TELEPHONE ENCOUNTER
"Requested Prescriptions   Pending Prescriptions Disp Refills     omeprazole (PRILOSEC) 40 MG capsule [Pharmacy Med Name: OMEPRAZOLE  40MG  CAP]  Last Written Prescription Date:  04/03/2018  Last Fill Quantity: 90 capsule,  # refills: 1   Last office visit: 6/25/2018 with prescribing provider:  Raffy Lucio PA-C    Future Office Visit:     90 capsule      Sig: TAKE 1 CAPSULE BY MOUTH  DAILY TAKE 30-60 MINUTES  BEFORE A MEAL.    PPI Protocol Passed    9/17/2018  8:45 PM       Passed - Not on Clopidogrel (unless Pantoprazole ordered)       Passed - No diagnosis of osteoporosis on record       Passed - Recent (12 mo) or future (30 days) visit within the authorizing provider's specialty    Patient had office visit in the last 12 months or has a visit in the next 30 days with authorizing provider or within the authorizing provider's specialty.  See \"Patient Info\" tab in inbasket, or \"Choose Columns\" in Meds & Orders section of the refill encounter.           Passed - Patient is age 18 or older          "

## 2018-09-18 NOTE — TELEPHONE ENCOUNTER
Prescription approved per [facility] Refill Protocol.  Payton Ford RN

## 2018-10-21 ENCOUNTER — MYC MEDICAL ADVICE (OUTPATIENT)
Dept: PEDIATRICS | Facility: CLINIC | Age: 58
End: 2018-10-21

## 2018-10-21 DIAGNOSIS — F32.0 MAJOR DEPRESSIVE DISORDER, SINGLE EPISODE, MILD (H): Primary | ICD-10-CM

## 2018-10-29 ENCOUNTER — MYC MEDICAL ADVICE (OUTPATIENT)
Dept: PEDIATRICS | Facility: CLINIC | Age: 58
End: 2018-10-29

## 2018-10-29 ENCOUNTER — OFFICE VISIT (OUTPATIENT)
Dept: URGENT CARE | Facility: URGENT CARE | Age: 58
End: 2018-10-29
Payer: COMMERCIAL

## 2018-10-29 VITALS
SYSTOLIC BLOOD PRESSURE: 112 MMHG | HEART RATE: 80 BPM | TEMPERATURE: 96.4 F | OXYGEN SATURATION: 99 % | DIASTOLIC BLOOD PRESSURE: 80 MMHG

## 2018-10-29 DIAGNOSIS — R10.9 FLANK PAIN: Primary | ICD-10-CM

## 2018-10-29 LAB
ALBUMIN SERPL-MCNC: 3.8 G/DL (ref 3.4–5)
ALP SERPL-CCNC: 91 U/L (ref 40–150)
ALT SERPL W P-5'-P-CCNC: 32 U/L (ref 0–70)
ANION GAP SERPL CALCULATED.3IONS-SCNC: 9 MMOL/L (ref 3–14)
AST SERPL W P-5'-P-CCNC: 19 U/L (ref 0–45)
BASOPHILS # BLD AUTO: 0 10E9/L (ref 0–0.2)
BASOPHILS NFR BLD AUTO: 0.6 %
BILIRUB SERPL-MCNC: 0.8 MG/DL (ref 0.2–1.3)
BUN SERPL-MCNC: 19 MG/DL (ref 7–30)
CALCIUM SERPL-MCNC: 9.2 MG/DL (ref 8.5–10.1)
CHLORIDE SERPL-SCNC: 104 MMOL/L (ref 94–109)
CO2 SERPL-SCNC: 31 MMOL/L (ref 20–32)
CREAT SERPL-MCNC: 0.8 MG/DL (ref 0.66–1.25)
DIFFERENTIAL METHOD BLD: NORMAL
EOSINOPHIL # BLD AUTO: 0.1 10E9/L (ref 0–0.7)
EOSINOPHIL NFR BLD AUTO: 2.2 %
ERYTHROCYTE [DISTWIDTH] IN BLOOD BY AUTOMATED COUNT: 13.1 % (ref 10–15)
GFR SERPL CREATININE-BSD FRML MDRD: >90 ML/MIN/1.7M2
GLUCOSE SERPL-MCNC: 99 MG/DL (ref 70–99)
HCT VFR BLD AUTO: 43.6 % (ref 40–53)
HGB BLD-MCNC: 15.2 G/DL (ref 13.3–17.7)
LYMPHOCYTES # BLD AUTO: 1.2 10E9/L (ref 0.8–5.3)
LYMPHOCYTES NFR BLD AUTO: 22.2 %
MCH RBC QN AUTO: 31 PG (ref 26.5–33)
MCHC RBC AUTO-ENTMCNC: 34.9 G/DL (ref 31.5–36.5)
MCV RBC AUTO: 89 FL (ref 78–100)
MONOCYTES # BLD AUTO: 0.7 10E9/L (ref 0–1.3)
MONOCYTES NFR BLD AUTO: 13.6 %
NEUTROPHILS # BLD AUTO: 3.3 10E9/L (ref 1.6–8.3)
NEUTROPHILS NFR BLD AUTO: 61.4 %
PLATELET # BLD AUTO: 259 10E9/L (ref 150–450)
POTASSIUM SERPL-SCNC: 4.1 MMOL/L (ref 3.4–5.3)
PROT SERPL-MCNC: 6.9 G/DL (ref 6.8–8.8)
RBC # BLD AUTO: 4.91 10E12/L (ref 4.4–5.9)
SODIUM SERPL-SCNC: 144 MMOL/L (ref 133–144)
WBC # BLD AUTO: 5.4 10E9/L (ref 4–11)

## 2018-10-29 PROCEDURE — 36415 COLL VENOUS BLD VENIPUNCTURE: CPT | Performed by: PHYSICIAN ASSISTANT

## 2018-10-29 PROCEDURE — 99214 OFFICE O/P EST MOD 30 MIN: CPT | Performed by: PHYSICIAN ASSISTANT

## 2018-10-29 PROCEDURE — 80053 COMPREHEN METABOLIC PANEL: CPT | Performed by: PHYSICIAN ASSISTANT

## 2018-10-29 PROCEDURE — 85025 COMPLETE CBC W/AUTO DIFF WBC: CPT | Performed by: PHYSICIAN ASSISTANT

## 2018-10-29 NOTE — TELEPHONE ENCOUNTER
Called the Pt. Pain is more in the Left flank area while taking a deep breath, rating it at about a 2-3 in intensity.   No trouble breathing, no SOB, no CP, no dizziness or lightheadedness.   Pain wore with movement.    Advised UC today. He will come into UC for evaluation. ER if symptoms worsen or trouble breathing.     Yazmin Galvin RN -- [facility]

## 2018-10-29 NOTE — MR AVS SNAPSHOT
After Visit Summary   10/29/2018    Sang Mtz    MRN: [id number]           Patient Information     Date Of Birth          1960        Visit Information        Provider Department      10/29/2018 4:20 PM Sara Davis PA-C [facility]        Today's Diagnoses     Flank pain    -  1       Follow-ups after your visit        Your next 10 appointments already scheduled     Nov 20, 2018 11:00 AM CST   (Arrive by 10:30 AM)   New Visit with Geovanna Husain, [facility] [place] ([facility])    [address]  [place] [address] [phone]            Dec 10, 2018 12:00 PM CST   Return Visit with Geovanna Husain [facility] [place] ([facility] [place])    [address]  [place] [id number] [phone]              Who to contact     If you have questions or need follow up information about today's clinic visit or your schedule please contact [facility] directly at [phone].  Normal or non-critical lab and imaging results will be communicated to you by [email or web address], letter or phone within 4 business days after the clinic has received the results. If you do not hear from us within 7 days, please contact the clinic through [email or web address] or phone. If you have a critical or abnormal lab result, we will notify you by phone as soon as possible.  Submit refill requests through Fastr or call your pharmacy and they will forward the refill request to us. Please allow 3 business days for your refill to be completed.          Additional Information About Your Visit        MyChart Information     Fastr gives you secure access to your electronic health record. If you see a primary care provider, you can also send messages to your care team and make appointments. If you have questions, please call your primary care clinic.  If you do not have a primary care provider, please call [phone] and they will assist  you.        Care EveryWhere ID     This is your Care EveryWhere ID. This could be used by other organizations to access your [place] medical records  [id number]        Your Vitals Were     Pulse Temperature Pulse Oximetry             80 96.4  F (35.8  C) (Tympanic) 99%          Blood Pressure from Last 3 Encounters:   10/29/18 112/80   07/22/18 122/60   06/25/18 132/72    Weight from Last 3 Encounters:   07/22/18 260 lb (117.9 kg)   06/25/18 259 lb 11.2 oz (117.8 kg)   01/16/18 259 lb 1.6 oz (117.5 kg)              We Performed the Following     CBC with platelets and differential     Comprehensive metabolic panel (BMP + Alb, Alk Phos, ALT, AST, Total. Bili, TP)          Today's Medication Changes          These changes are accurate as of 10/29/18 11:59 PM.  If you have any questions, ask your nurse or doctor.               These medicines have changed or have updated prescriptions.        Dose/Directions    escitalopram 10 MG tablet   Commonly known as:  LEXAPRO   This may have changed:    - how much to take  - additional instructions   Used for:  Major depressive disorder, single episode, mild (H)        TAKE 1 TABLET BY MOUTH  DAILY   Quantity:  90 tablet   Refills:  3                Primary Care Provider Office Phone # Fax #    Jethro Villalba [phone]       [facility] [place] [address]        Equal Access to Services     [facility] AH: Hadii bessie hcerry hadnasrino Sotodd, waaxda luqadaha, qaybta kaalmada parvin, jason partida. So [facility] [phone].    ATENCIÓN: Si habla español, tiene a enamorado disposición servicios gratuitos de asistencia lingüística. Llsowmya al [phone].    We comply with applicable federal civil rights laws and [place] laws. We do not discriminate on the basis of race, color, national origin, age, disability, sex, sexual orientation, or gender identity.            Thank you!     Thank you for choosing [facility]   for your care. Our goal is always to provide you with excellent care. Hearing back from our patients is one way we can continue to improve our services. Please take a few minutes to complete the written survey that you may receive in the mail after your visit with us. Thank you!             Your Updated Medication List - Protect others around you: Learn how to safely use, store and throw away your medicines at www.disposemymeds.org.          This list is accurate as of 10/29/18 11:59 PM.  Always use your most recent med list.                   Brand Name Dispense Instructions for use Diagnosis    aspirin 81 MG tablet      1 TABLET DAILY        escitalopram 10 MG tablet    LEXAPRO    90 tablet    TAKE 1 TABLET BY MOUTH  DAILY    Major depressive disorder, single episode, mild (H)       ibuprofen 400-800 mg tablet    ADVIL,MOTRIN     Take 400-800 mg by mouth every 6 hours as needed.        Multi-vitamin Tabs tablet   Generic drug:  multivitamin, therapeutic with minerals           omeprazole 40 MG capsule    priLOSEC    90 capsule    TAKE 1 CAPSULE BY MOUTH  DAILY TAKE 30-60 MINUTES  BEFORE A MEAL.    Gastroesophageal reflux disease without esophagitis       sildenafil 100 MG tablet    VIAGRA    3 tablet    Take 0.5-1 tablets by mouth daily as needed for erectile dysfunction.    Erectile dysfunction

## 2018-10-29 NOTE — PROGRESS NOTES
CHIEF COMPLAINT:   Chief Complaint   Patient presents with     Urgent Care     Flank Pain     left side pain comes and goes since wednesday        HPI: Sang Mtz is a 58 year old male who presents to clinic today for evaluation of Left side flank pain. He notes that the pain is constant, but worsens if he takes a deep breath. HE also notes that the pain is worse when he presses on it. He rates the pain as a 2-3/10. He has taken IBU for his pain without full relief. He denies a change in activity like raking or new exercises. H denies having a fever, chills, SOB, CP, pleuritic CP, n/v/d, dysuria, frequency or hematuria or rash. Bowel movements have been normal per patient.       Past Medical History:   Diagnosis Date     Depressive disorder      Hypertriglyceridemia 9/13/2010     Past Surgical History:   Procedure Laterality Date     COLONOSCOPY  8/3/2012    Procedure: COLONOSCOPY;  COLONOSCOPY;  Surgeon: Chandler Worthy MD;  Location:  GI     COLONOSCOPY N/A 8/4/2017    Procedure: COMBINED COLONOSCOPY, SINGLE OR MULTIPLE BIOPSY/POLYPECTOMY BY BIOPSY;  COLONOSCOPY ;  Surgeon: Jessica Mendoza MD;  Location:  GI     HC REMOVAL OF TONSILS,<13 Y/O       Social History   Substance Use Topics     Smoking status: Never Smoker     Smokeless tobacco: Never Used     Alcohol use No     Current Outpatient Prescriptions   Medication     ASPIRIN 81 MG OR TABS     escitalopram (LEXAPRO) 10 MG tablet     ibuprofen (ADVIL,MOTRIN) 400-800 mg tablet     MULTI-VITAMIN OR TABS     omeprazole (PRILOSEC) 40 MG capsule     sildenafil (VIAGRA) 100 MG tablet     No current facility-administered medications for this visit.      No Known Allergies    10 point ROS of systems including Constitutional, Eyes, Respiratory, Cardiovascular, Gastroenterology, Genitourinary, Integumentary, Muscularskeletal, Psychiatric were all negative except for pertinent positives noted in my HPI.        Exam:  /80 (BP Location: Right arm,  Patient Position: Chair, Cuff Size: Adult Large)  Pulse 80  Temp 96.4  F (35.8  C) (Tympanic)  SpO2 99%  Constitutional: healthy, alert and no distress  Head: Normocephalic, atraumatic.  Eyes: conjunctiva clear, no drainage  ENT: TMs clear and shiny kimberly, nasal mucosa pink and moist, throat without tonsillar hypertrophy or erythema  Neck: neck is supple, no cervical lymphadenopathy or nuchal rigidity  Cardiovascular: RRR  Respiratory: CTA bilaterally, no rhonchi or rales  Gastrointestinal: soft and nontender over ventral aspect of abdomen. Patient has mild TTP over left lateral side and discomfort with stretching.   Skin: no rashes  Neurologic: Speech clear, gait normal. Moves all extremities.    Results for orders placed or performed in visit on 10/29/18   CBC with platelets and differential   Result Value Ref Range    WBC 5.4 4.0 - 11.0 10e9/L    RBC Count 4.91 4.4 - 5.9 10e12/L    Hemoglobin 15.2 13.3 - 17.7 g/dL    Hematocrit 43.6 40.0 - 53.0 %    MCV 89 78 - 100 fl    MCH 31.0 26.5 - 33.0 pg    MCHC 34.9 31.5 - 36.5 g/dL    RDW 13.1 10.0 - 15.0 %    Platelet Count 259 150 - 450 10e9/L    Diff Method Automated Method     % Neutrophils 61.4 %    % Lymphocytes 22.2 %    % Monocytes 13.6 %    % Eosinophils 2.2 %    % Basophils 0.6 %    Absolute Neutrophil 3.3 1.6 - 8.3 10e9/L    Absolute Lymphocytes 1.2 0.8 - 5.3 10e9/L    Absolute Monocytes 0.7 0.0 - 1.3 10e9/L    Absolute Eosinophils 0.1 0.0 - 0.7 10e9/L    Absolute Basophils 0.0 0.0 - 0.2 10e9/L   Comprehensive metabolic panel (BMP + Alb, Alk Phos, ALT, AST, Total. Bili, TP)   Result Value Ref Range    Sodium 144 133 - 144 mmol/L    Potassium 4.1 3.4 - 5.3 mmol/L    Chloride 104 94 - 109 mmol/L    Carbon Dioxide 31 20 - 32 mmol/L    Anion Gap 9 3 - 14 mmol/L    Glucose 99 70 - 99 mg/dL    Urea Nitrogen 19 7 - 30 mg/dL    Creatinine 0.80 0.66 - 1.25 mg/dL    GFR Estimate >90 >60 mL/min/1.7m2    GFR Estimate If Black >90 >60 mL/min/1.7m2    Calcium 9.2 8.5 -  10.1 mg/dL    Bilirubin Total 0.8 0.2 - 1.3 mg/dL    Albumin 3.8 3.4 - 5.0 g/dL    Protein Total 6.9 6.8 - 8.8 g/dL    Alkaline Phosphatase 91 40 - 150 U/L    ALT 32 0 - 70 U/L    AST 19 0 - 45 U/L         ASSESSMENT/PLAN:  1. Flank pain  58 year old male with a 5 day history of side pain presents to the clinic for evaluation. NO historical or physical exam findings concerning for another etiology other than muscular pain. He does have TTP over his left side and twisting elicits pain. All labs are WNL. Broad differential was considered including splenic enlargement, peritonitis, diverticulitis, UTI, pyelonephritis and nephrolithiasis. Start taking IBU 600mg for pain and follow-up this week if pain persists for further evaluation and imaging. Severe abdominal pain to the ED. Patient agrees with treatment plan.  - CBC with platelets and differential  - Comprehensive metabolic panel (BMP + Alb, Alk Phos, ALT, AST, Total. Bili, TP)    Sara Davis PA-C

## 2018-11-20 ENCOUNTER — OFFICE VISIT (OUTPATIENT)
Dept: PSYCHOLOGY | Facility: CLINIC | Age: 58
End: 2018-11-20
Attending: INTERNAL MEDICINE
Payer: COMMERCIAL

## 2018-11-20 DIAGNOSIS — F43.21 ADJUSTMENT DISORDER WITH DEPRESSED MOOD: Primary | ICD-10-CM

## 2018-11-20 PROCEDURE — 90834 PSYTX W PT 45 MINUTES: CPT | Performed by: SOCIAL WORKER

## 2018-12-10 ENCOUNTER — OFFICE VISIT (OUTPATIENT)
Dept: PSYCHOLOGY | Facility: CLINIC | Age: 58
End: 2018-12-10
Attending: INTERNAL MEDICINE
Payer: COMMERCIAL

## 2018-12-10 DIAGNOSIS — F43.21 ADJUSTMENT DISORDER WITH DEPRESSED MOOD: Primary | ICD-10-CM

## 2018-12-10 PROCEDURE — 90791 PSYCH DIAGNOSTIC EVALUATION: CPT | Performed by: SOCIAL WORKER

## 2018-12-11 DIAGNOSIS — F32.0 MAJOR DEPRESSIVE DISORDER, SINGLE EPISODE, MILD (H): ICD-10-CM

## 2018-12-12 RX ORDER — ESCITALOPRAM OXALATE 10 MG/1
TABLET ORAL
Qty: 90 TABLET | Refills: 0 | Status: SHIPPED | OUTPATIENT
Start: 2018-12-12 | End: 2019-01-22

## 2018-12-12 ASSESSMENT — ANXIETY QUESTIONNAIRES
3. WORRYING TOO MUCH ABOUT DIFFERENT THINGS: NOT AT ALL
7. FEELING AFRAID AS IF SOMETHING AWFUL MIGHT HAPPEN: NOT AT ALL
1. FEELING NERVOUS, ANXIOUS, OR ON EDGE: NOT AT ALL
2. NOT BEING ABLE TO STOP OR CONTROL WORRYING: NOT AT ALL
6. BECOMING EASILY ANNOYED OR IRRITABLE: NOT AT ALL
IF YOU CHECKED OFF ANY PROBLEMS ON THIS QUESTIONNAIRE, HOW DIFFICULT HAVE THESE PROBLEMS MADE IT FOR YOU TO DO YOUR WORK, TAKE CARE OF THINGS AT HOME, OR GET ALONG WITH OTHER PEOPLE: NOT DIFFICULT AT ALL
GAD7 TOTAL SCORE: 0
5. BEING SO RESTLESS THAT IT IS HARD TO SIT STILL: NOT AT ALL

## 2018-12-12 ASSESSMENT — PATIENT HEALTH QUESTIONNAIRE - PHQ9
5. POOR APPETITE OR OVEREATING: NOT AT ALL
SUM OF ALL RESPONSES TO PHQ QUESTIONS 1-9: 1

## 2018-12-12 NOTE — TELEPHONE ENCOUNTER
"Requested Prescriptions   Pending Prescriptions Disp Refills     escitalopram (LEXAPRO) 10 MG tablet [Pharmacy Med Name: ESCITALOPRAM  10MG  TAB]  Last Written Prescription Date:  01/16/2018  Last Fill Quantity: 90 tablet,  # refills: 3   Last office visit: 6/25/2018 with prescribing provider:  Raffy Lucio PA-C    Future Office Visit:   Next 5 appointments (look out 90 days)    Jan 02, 2019 10:00 AM CST  Return Visit with Geovanna Husain, [facility] ([facility]) [address] [place] [address] [address]  [place] [address]  [phone]   Jan 22, 2019 10:00 AM CST  MyChart Physical Adult with Jethro Villalba MD  [facility] [address] [place] [address]  [address]  [facility] [address]  [phone]   Jan 22, 2019  2:00 PM CST  Return Visit with Geovanna Husain [facility] ([facility]) [address] [address]  [place] [address]  [phone]   Feb 05, 2019 12:00 PM CST  Return Visit with Geovanna Husain [facility] ([facility]) [address] [address]  [place] [address]  [phone]          90 tablet 3     Sig: TAKE 1 TABLET BY MOUTH  DAILY    SSRIs Protocol Failed - 12/11/2018  8:18 PM       Failed - PHQ-9 score less than 5 in past 6 months    Please review last PHQ-9 score.   PHQ-9 SCORE 1/31/2017 12/12/2017 1/16/2018   PHQ-9 Total Score MyChart - 0 -   PHQ-9 Total Score 0 0 0     ROLAN-7 SCORE 1/31/2017 12/12/2017 1/16/2018   Total Score - 1 (minimal anxiety) -   Total Score 2 1 0              Passed - Patient is age 18 or older       Passed - Recent (6 mo) or future (30 days) visit within the authorizing provider's specialty    Patient had office visit in the last 6 months or has a visit in the next 30 days with authorizing provider or within the authorizing provider's specialty.  See \"Patient Info\" tab in inbasket, or \"Choose Columns\" in Meds & Orders section of the refill " encounter.

## 2018-12-12 NOTE — PROGRESS NOTES
Progress Note - Initial Session    Client Name:  Sang Mtz Date: 11/20/2018         Service Type: Diagnostic Assessment, Part 1      Session Start Time: 11:00 am  Session End Time: 11:50 am      Session Length: 38 - 52      Session #: 1     Attendees: Client attended alone         Diagnostic Assessment in progress.  Unable to complete documentation at the conclusion of the first session due to not having enough time to got through the intake paperwork. Will resume and complete during the return visit. Presenting concerns included stress, issues related to transitioning into retirment, and relationship concerns.      Mental Status Assessment:  Appearance:   Appropriate   Eye Contact:   Fair   Psychomotor Behavior: Normal   Attitude:   Cooperative   Orientation:   All  Speech   Rate / Production: Normal    Volume:  Normal   Mood:    Normal  Affect:    Appropriate   Thought Content:  Clear   Thought Form:   Coherent  Logical   Insight:    Fair       Safety Issues and Plan for Safety and Risk Management:  Client denies current fears or concerns for personal safety.  Client denies current or recent suicidal ideation or behaviors.  Client denies current or recent homicidal ideation or behaviors.  Client denies current or recent self injurious behavior or ideation.  Client denies other safety concerns.  A safety and risk management plan has not been developed at this time, however client was given the after-hours number / 911 should there be a change in any of these risk factors.  Client reports there are no firearms in the house.      Diagnostic Criteria:  A. The development of emotional or behavioral symptoms in response to an identifiable stressor(s) occurring within 3 months of the onset of the stressor(s)  B. These symptoms or behaviors are clinically significant, as evidenced by one or both of the following:       - Significant impairment in social, occupational, or other important areas of  functioning  C. The stress-related disturbance does not meet criteria for another disorder & is not not an exacerbation of another mental disorder  D. The symptoms do not represent normal bereavement  E. Once the stressor or its consequences have terminated, the symptoms do not persist for more than an additional 6 months       * Adjustment Disorder with Depressed Mood: The predominant manifestations are symptoms such as low mood, tearfulness, or feelings of hopelessness        DSM5 Diagnoses: (Sustained by DSM5 Criteria Listed Above)  Diagnoses: Adjustment Disorders  309.0 (F43.21) With depressed mood  Psychosocial & Contextual Factors: semi retired; relationship issues.  WHODAS 2.0 (12 item)            This questionnaire asks about difficulties due to health conditions. Health conditions include disease or illnesses, other health problems that may be short or long lasting, injuries, mental health or emotional problems, and problems with alcohol or drugs.                     Think back over the past 30 days and answer these questions, thinking about how much difficulty you had doing the following activities. For each question, please [place] only one response.    S1 Standing for long periods such as 30 minutes? None =         1   S2 Taking care of household responsibilities? None =         1   S3 Learning a new task, for example, learning how to get to a new place? None =         1   S4 How much of a problem do you have joining community activities (for example, festivals, [employer] or other activities) in the same way as anyone else can? Severe =       4   S5 How much have you been emotionally affected by your health problems? Mild =           2     In the past 30 days, how much difficulty did you have in:   S6 Concentrating on doing something for ten minutes? None =         1   S7 Walking a long distance such as a kilometer (or equivalent)? None =         1   S8 Washing your whole body? None =         1   S9  Getting dressed? None =         1   S10 Dealing with people you do not know? Mild =           2   S11 Maintaining a friendship? None =         1   S12 Your day to day work? Mild =           2     H1 Overall, in the past 30 days, how many days were these difficulties present? Record number of days: 3   H2 In the past 30 days, for how many days were you totally unable to carry out your usual activities or work because of any health condition? Record number of days:  0   H3 In the past 30 days, not counting the days that you were totally unable, for how many days did you cut back or reduce your usual activities or work because of any health condition? Record number of days: 0       Collateral Reports Completed:  Will route completed DA to PCP when completed.      PLAN: (Homework, other):  Client stated that he may follow up for ongoing services with [facility].        Geovanna Husain, LICSW

## 2018-12-12 NOTE — TELEPHONE ENCOUNTER
Medication is being filled for 1 time refill only due to:  Patient needs to be seen because due for yearly OV. needs PHQ9 and GAD7..     Patient has OV scheduled 1/22/2019.    Jenae Aponte RN

## 2018-12-13 ASSESSMENT — ANXIETY QUESTIONNAIRES: GAD7 TOTAL SCORE: 0

## 2018-12-22 NOTE — PROGRESS NOTES
Progress Note - Initial Session    Client Name:  Sang Mtz Date: 12/10/2018         Service Type: Diagnostic Assessment, Part 2      Session Start Time: 12:00 pm  Session End Time: 12:50 pm      Session Length: 38 - 52      Session #: 2     Attendees: Client attended alone         Diagnostic Assessment completed. Presenting concerns included stress, issues related to transitioning into retirment, and relationship concerns.      Mental Status Assessment:  Appearance:   Appropriate   Eye Contact:   Fair   Psychomotor Behavior: Normal   Attitude:   Cooperative   Orientation:   All  Speech   Rate / Production: Normal    Volume:  Normal   Mood:    Normal  Affect:    Appropriate   Thought Content:  Clear   Thought Form:   Coherent  Logical   Insight:    Fair       Safety Issues and Plan for Safety and Risk Management:  Client denies current fears or concerns for personal safety.  Client denies current or recent suicidal ideation or behaviors.  Client denies current or recent homicidal ideation or behaviors.  Client denies current or recent self injurious behavior or ideation.  Client denies other safety concerns.  A safety and risk management plan has not been developed at this time, however client was given the after-hours number / 911 should there be a change in any of these risk factors.  Client reports there are no firearms in the house.      Diagnostic Criteria:  A. The development of emotional or behavioral symptoms in response to an identifiable stressor(s) occurring within 3 months of the onset of the stressor(s)  B. These symptoms or behaviors are clinically significant, as evidenced by one or both of the following:       - Significant impairment in social, occupational, or other important areas of functioning  C. The stress-related disturbance does not meet criteria for another disorder & is not not an exacerbation of another mental disorder  D. The symptoms do not represent normal  bereavement  E. Once the stressor or its consequences have terminated, the symptoms do not persist for more than an additional 6 months       * Adjustment Disorder with Depressed Mood: The predominant manifestations are symptoms such as low mood, tearfulness, or feelings of hopelessness        DSM5 Diagnoses: (Sustained by DSM5 Criteria Listed Above)  Diagnoses: Adjustment Disorders  309.0 (F43.21) With depressed mood  Psychosocial & Contextual Factors: semi retired; relationship issues.  WHODAS 2.0 (12 item)            This questionnaire asks about difficulties due to health conditions. Health conditions include disease or illnesses, other health problems that may be short or long lasting, injuries, mental health or emotional problems, and problems with alcohol or drugs.                     Think back over the past 30 days and answer these questions, thinking about how much difficulty you had doing the following activities. For each question, please [place] only one response.    S1 Standing for long periods such as 30 minutes? None =         1   S2 Taking care of household responsibilities? None =         1   S3 Learning a new task, for example, learning how to get to a new place? None =         1   S4 How much of a problem do you have joining community activities (for example, festivals, [employer] or other activities) in the same way as anyone else can? Severe =       4   S5 How much have you been emotionally affected by your health problems? Mild =           2     In the past 30 days, how much difficulty did you have in:   S6 Concentrating on doing something for ten minutes? None =         1   S7 Walking a long distance such as a kilometer (or equivalent)? None =         1   S8 Washing your whole body? None =         1   S9 Getting dressed? None =         1   S10 Dealing with people you do not know? Mild =           2   S11 Maintaining a friendship? None =         1   S12 Your day to day work? Mild =           2      H1 Overall, in the past 30 days, how many days were these difficulties present? Record number of days: 3   H2 In the past 30 days, for how many days were you totally unable to carry out your usual activities or work because of any health condition? Record number of days:  0   H3 In the past 30 days, not counting the days that you were totally unable, for how many days did you cut back or reduce your usual activities or work because of any health condition? Record number of days: 0       Collateral Reports Completed:  Will route completed DA to PCP when completed.      PLAN: (Homework, other):  Client stated that he may follow up for ongoing services with [facility].        Geovanna Husain, LICSW

## 2019-01-22 ENCOUNTER — OFFICE VISIT (OUTPATIENT)
Dept: PEDIATRICS | Facility: CLINIC | Age: 59
End: 2019-01-22
Payer: COMMERCIAL

## 2019-01-22 ENCOUNTER — OFFICE VISIT (OUTPATIENT)
Dept: PSYCHOLOGY | Facility: CLINIC | Age: 59
End: 2019-01-22
Payer: COMMERCIAL

## 2019-01-22 VITALS
WEIGHT: 265.7 LBS | HEART RATE: 84 BPM | RESPIRATION RATE: 16 BRPM | HEIGHT: 75 IN | SYSTOLIC BLOOD PRESSURE: 118 MMHG | TEMPERATURE: 98.7 F | DIASTOLIC BLOOD PRESSURE: 84 MMHG | OXYGEN SATURATION: 95 % | BODY MASS INDEX: 33.04 KG/M2

## 2019-01-22 DIAGNOSIS — F32.0 MAJOR DEPRESSIVE DISORDER, SINGLE EPISODE, MILD (H): ICD-10-CM

## 2019-01-22 DIAGNOSIS — Z00.00 ENCOUNTER FOR ROUTINE ADULT HEALTH EXAMINATION WITHOUT ABNORMAL FINDINGS: Primary | ICD-10-CM

## 2019-01-22 DIAGNOSIS — F43.21 ADJUSTMENT DISORDER WITH DEPRESSED MOOD: Primary | ICD-10-CM

## 2019-01-22 DIAGNOSIS — F41.9 ANXIETY: ICD-10-CM

## 2019-01-22 DIAGNOSIS — F33.8 SEASONAL AFFECTIVE DISORDER (H): ICD-10-CM

## 2019-01-22 DIAGNOSIS — Z12.5 PROSTATE CANCER SCREENING: ICD-10-CM

## 2019-01-22 DIAGNOSIS — E78.5 HYPERLIPIDEMIA LDL GOAL <130: ICD-10-CM

## 2019-01-22 LAB
ALBUMIN SERPL-MCNC: 3.8 G/DL (ref 3.4–5)
ALP SERPL-CCNC: 103 U/L (ref 40–150)
ALT SERPL W P-5'-P-CCNC: 34 U/L (ref 0–70)
ANION GAP SERPL CALCULATED.3IONS-SCNC: 4 MMOL/L (ref 3–14)
AST SERPL W P-5'-P-CCNC: 16 U/L (ref 0–45)
BILIRUB SERPL-MCNC: 0.7 MG/DL (ref 0.2–1.3)
BUN SERPL-MCNC: 25 MG/DL (ref 7–30)
CALCIUM SERPL-MCNC: 9.2 MG/DL (ref 8.5–10.1)
CHLORIDE SERPL-SCNC: 109 MMOL/L (ref 94–109)
CHOLEST SERPL-MCNC: 164 MG/DL
CO2 SERPL-SCNC: 25 MMOL/L (ref 20–32)
CREAT SERPL-MCNC: 0.86 MG/DL (ref 0.66–1.25)
GFR SERPL CREATININE-BSD FRML MDRD: >90 ML/MIN/{1.73_M2}
GLUCOSE SERPL-MCNC: 97 MG/DL (ref 70–99)
HDLC SERPL-MCNC: 29 MG/DL
LDLC SERPL CALC-MCNC: 102 MG/DL
NONHDLC SERPL-MCNC: 135 MG/DL
POTASSIUM SERPL-SCNC: 3.9 MMOL/L (ref 3.4–5.3)
PROT SERPL-MCNC: 7.3 G/DL (ref 6.8–8.8)
PSA SERPL-ACNC: 1.5 UG/L (ref 0–4)
SODIUM SERPL-SCNC: 138 MMOL/L (ref 133–144)
TRIGL SERPL-MCNC: 164 MG/DL

## 2019-01-22 PROCEDURE — 80061 LIPID PANEL: CPT | Performed by: INTERNAL MEDICINE

## 2019-01-22 PROCEDURE — 80053 COMPREHEN METABOLIC PANEL: CPT | Performed by: INTERNAL MEDICINE

## 2019-01-22 PROCEDURE — 99396 PREV VISIT EST AGE 40-64: CPT | Performed by: INTERNAL MEDICINE

## 2019-01-22 PROCEDURE — 36415 COLL VENOUS BLD VENIPUNCTURE: CPT | Performed by: INTERNAL MEDICINE

## 2019-01-22 PROCEDURE — 90834 PSYTX W PT 45 MINUTES: CPT | Performed by: SOCIAL WORKER

## 2019-01-22 PROCEDURE — G0103 PSA SCREENING: HCPCS | Performed by: INTERNAL MEDICINE

## 2019-01-22 RX ORDER — ESCITALOPRAM OXALATE 10 MG/1
10 TABLET ORAL DAILY
Qty: 90 TABLET | Refills: 3 | Status: SHIPPED | OUTPATIENT
Start: 2019-01-22 | End: 2019-02-23

## 2019-01-22 ASSESSMENT — ENCOUNTER SYMPTOMS
MYALGIAS: 0
HEADACHES: 0
PARESTHESIAS: 0
WEAKNESS: 0
SHORTNESS OF BREATH: 0
EYE PAIN: 0
SORE THROAT: 0
DIZZINESS: 0
CHILLS: 0
CONSTIPATION: 0
DIARRHEA: 0
HEMATOCHEZIA: 0
JOINT SWELLING: 0
DYSURIA: 0
HEMATURIA: 0
ABDOMINAL PAIN: 0
ARTHRALGIAS: 0
COUGH: 0
FREQUENCY: 0
FEVER: 0
NERVOUS/ANXIOUS: 0
PALPITATIONS: 0
HEARTBURN: 0
NAUSEA: 0

## 2019-01-22 ASSESSMENT — PATIENT HEALTH QUESTIONNAIRE - PHQ9
SUM OF ALL RESPONSES TO PHQ QUESTIONS 1-9: 0
5. POOR APPETITE OR OVEREATING: NOT AT ALL
SUM OF ALL RESPONSES TO PHQ QUESTIONS 1-9: 3

## 2019-01-22 ASSESSMENT — ANXIETY QUESTIONNAIRES
1. FEELING NERVOUS, ANXIOUS, OR ON EDGE: NOT AT ALL
3. WORRYING TOO MUCH ABOUT DIFFERENT THINGS: NOT AT ALL
2. NOT BEING ABLE TO STOP OR CONTROL WORRYING: NOT AT ALL
5. BEING SO RESTLESS THAT IT IS HARD TO SIT STILL: NOT AT ALL
7. FEELING AFRAID AS IF SOMETHING AWFUL MIGHT HAPPEN: NOT AT ALL
6. BECOMING EASILY ANNOYED OR IRRITABLE: NOT AT ALL
GAD7 TOTAL SCORE: 0
IF YOU CHECKED OFF ANY PROBLEMS ON THIS QUESTIONNAIRE, HOW DIFFICULT HAVE THESE PROBLEMS MADE IT FOR YOU TO DO YOUR WORK, TAKE CARE OF THINGS AT HOME, OR GET ALONG WITH OTHER PEOPLE: NOT DIFFICULT AT ALL

## 2019-01-22 ASSESSMENT — MIFFLIN-ST. JEOR: SCORE: 2100.84

## 2019-01-22 NOTE — PROGRESS NOTES
SUBJECTIVE:   CC: Sang Mtz is an 58 year old man who presents for preventive health visit.     Physical   Annual:     Getting at least 3 servings of Calcium per day:  Yes    Bi-annual eye exam:  Yes    Dental care twice a year:  Yes    Sleep apnea or symptoms of sleep apnea:  None    Diet:  Regular (no restrictions)    Frequency of exercise:  2-3 days/week    Duration of exercise:  15-30 minutes    Taking medications regularly:  Yes    Medication side effects:  None    Additional concerns today:  No    PHQ-2 Total Score: 0      Patient here for RHM exam, still seeign a therpist and has follow-up appointment today, spouce is coming too.  That his mood is been slightly down for the last few weeks.  He notes this is likely related to the decrease in daylight given the season as well as a lot of colic over the last several days.  He has been discussing this with his therapist who has given him a diagnosis of seasonal affective disorder.  He is using his seasonal affective disorder lamp 30 minutes a day and this does seem to be helpful.  He at times takes only half of the citalopram however he did tolerate a whole tablet in the past.  Is open to get away for some warm weather vacation soon and does have a conference coming up at [place] which she is looking forward to.  Patient denies any thoughts of self-harm or hurting others.  He has no side effect of the medication that he noted.  He has no other concerns or complaints about his health today.  He is fasting for labs today.    Today's PHQ-2 Score: 3  PHQ-2 ( 1999 Pfizer) 1/22/2019   Q1: Little interest or pleasure in doing things 0   Q2: Feeling down, depressed or hopeless 0   PHQ-2 Score 0   Q1: Little interest or pleasure in doing things Not at all   Q2: Feeling down, depressed or hopeless Not at all   PHQ-2 Score 0       Abuse: Current or Past(Physical, Sexual or Emotional)- No  Do you feel safe in your environment? Yes    Social History     Tobacco  "Use     Smoking status: Never Smoker     Smokeless tobacco: Never Used   Substance Use Topics     Alcohol use: No     Alcohol Use 1/22/2019   If you drink alcohol do you typically have greater than 3 drinks per day OR greater than 7 drinks per week? Not Applicable   No flowsheet data found.    Reviewed orders with patient.  Reviewed health maintenance and updated orders accordingly - Yes  BP Readings from Last 3 Encounters:   01/22/19 118/84   10/29/18 112/80   07/22/18 122/60    Wt Readings from Last 3 Encounters:   01/22/19 120.5 kg (265 lb 11.2 oz)   07/22/18 117.9 kg (260 lb)   06/25/18 117.8 kg (259 lb 11.2 oz)               Reviewed and updated as needed this visit by clinical staff  Allergies  Meds         Reviewed and updated as needed this visit by Provider            Review of Systems   Constitutional: Negative for chills and fever.   HENT: Negative for congestion, ear pain, hearing loss and sore throat.    Eyes: Negative for pain and visual disturbance.   Respiratory: Negative for cough and shortness of breath.    Cardiovascular: Negative for chest pain, palpitations and peripheral edema.   Gastrointestinal: Negative for abdominal pain, constipation, diarrhea, heartburn, hematochezia and nausea.   Genitourinary: Positive for impotence. Negative for discharge, dysuria, frequency, genital sores, hematuria and urgency.   Musculoskeletal: Negative for arthralgias, joint swelling and myalgias.   Skin: Negative for rash.   Neurological: Negative for dizziness, weakness, headaches and paresthesias.   Psychiatric/Behavioral: Negative for mood changes. The patient is not nervous/anxious.      reviwed above, impotence is at baseline. patient has Viagra to use and works well, does not need refill. No other concerns or complaints today.      OBJECTIVE:   /84   Pulse 84   Temp 98.7  F (37.1  C) (Oral)   Resp 16   Ht 1.905 m (6' 3\")   Wt 120.5 kg (265 lb 11.2 oz)   SpO2 95%   BMI 33.21 kg/m     Physical " Exam  GENERAL: healthy, alert and no distress  EYES: Eyes grossly normal to inspection, PERRL and conjunctivae and sclerae normal  HENT: ear canals and TM's normal, nose and mouth without ulcers or lesions  NECK: no adenopathy, no asymmetry  RESP: lungs clear to auscultation - no rales, rhonchi or wheezes  CV: regular rate and rhythm, normal S1 S2, no S3 or S4, no murmur, click or rub, no peripheral edema and peripheral pulses strong  ABDOMEN: soft, nontender, no hepatosplenomegaly, no masses and bowel sounds normal  MS: no gross musculoskeletal defects noted, no edema  SKIN: no suspicious lesions or rashes  NEURO: Normal strength and tone, mentation intact and speech normal  PSYCH: mentation appears normal, affect normal/bright  Presentation- normal:Abstract reasoning  Attention and concentration  Coherency and relevance of thought  Dress, grooming, personal hygiene  Facial expression  Information  Judgment  Memory  Mood  Orientation  Perceptions  Posture and motor behavoir  Speech  Thought content  Vocabulary,abnormal:none        PHQ-9 SCORE 1/16/2018 12/12/2018 1/22/2019   PHQ-9 Total Score MyChart - - -   PHQ-9 Total Score 0 1 3     ROLAN-7 SCORE 12/12/2017 1/16/2018 12/12/2018   Total Score 1 (minimal anxiety) - -   Total Score 1 0 0       Results for orders placed or performed in visit on 10/29/18   CBC with platelets and differential   Result Value Ref Range    WBC 5.4 4.0 - 11.0 10e9/L    RBC Count 4.91 4.4 - 5.9 10e12/L    Hemoglobin 15.2 13.3 - 17.7 g/dL    Hematocrit 43.6 40.0 - 53.0 %    MCV 89 78 - 100 fl    MCH 31.0 26.5 - 33.0 pg    MCHC 34.9 31.5 - 36.5 g/dL    RDW 13.1 10.0 - 15.0 %    Platelet Count 259 150 - 450 10e9/L    Diff Method Automated Method     % Neutrophils 61.4 %    % Lymphocytes 22.2 %    % Monocytes 13.6 %    % Eosinophils 2.2 %    % Basophils 0.6 %    Absolute Neutrophil 3.3 1.6 - 8.3 10e9/L    Absolute Lymphocytes 1.2 0.8 - 5.3 10e9/L    Absolute Monocytes 0.7 0.0 - 1.3 10e9/L     Absolute Eosinophils 0.1 0.0 - 0.7 10e9/L    Absolute Basophils 0.0 0.0 - 0.2 10e9/L   Comprehensive metabolic panel (BMP + Alb, Alk Phos, ALT, AST, Total. Bili, TP)   Result Value Ref Range    Sodium 144 133 - 144 mmol/L    Potassium 4.1 3.4 - 5.3 mmol/L    Chloride 104 94 - 109 mmol/L    Carbon Dioxide 31 20 - 32 mmol/L    Anion Gap 9 3 - 14 mmol/L    Glucose 99 70 - 99 mg/dL    Urea Nitrogen 19 7 - 30 mg/dL    Creatinine 0.80 0.66 - 1.25 mg/dL    GFR Estimate >90 >60 mL/min/1.7m2    GFR Estimate If Black >90 >60 mL/min/1.7m2    Calcium 9.2 8.5 - 10.1 mg/dL    Bilirubin Total 0.8 0.2 - 1.3 mg/dL    Albumin 3.8 3.4 - 5.0 g/dL    Protein Total 6.9 6.8 - 8.8 g/dL    Alkaline Phosphatase 91 40 - 150 U/L    ALT 32 0 - 70 U/L    AST 19 0 - 45 U/L       ASSESSMENT/PLAN:   1. Encounter for routine adult health examination without abnormal findings  d/w pt preventative care measures including seat belt use, bike helmet, moderation of EtOH, avoiding tobacco, avoiding excessive sun exposure/sunscreen, wt management or wt loss if BMI > 30, need to screen for lipid disorders, mood disorders, CAD risk factors, etc. Also discussed accident prevention and future RHM schedule.   - Comprehensive metabolic panel (BMP + Alb, Alk Phos, ALT, AST, Total. Bili, TP)  - Lipid panel reflex to direct LDL Fasting  - PSA, screen    2. Seasonal affective disorder (H)  discussed with patient (or patient's parents/caregiver) pathophysiology of condition and treatment options.  I am glad the patient is working with his mental health provider and still taking his medication for this.  I do agree with the use of a seasonal affective disorder lamp 30 minutes a day during the winter months.  Did review potential risks and benefits of this modality with the patient again today.  He has a follow-up appoint with his therapist today and encouraged him to continue using multiple modalities to help manage his symptoms.  The patient will keep me posted if  "anything changes or gets worse she will let me know immediately.    3. Major depressive disorder, single episode, mild (H)  as above. Reviewed concept of depression as function of biochemical imbalance of neurotransmitters/rationale for treatment.  Risks and benefits of medication(s) reviewed with patient.  Questions answered.  Followup appointment in 1 year(s)  Patient instructed to call for significant side effects medications or problems   - Comprehensive metabolic panel (BMP + Alb, Alk Phos, ALT, AST, Total. Bili, TP)  - escitalopram (LEXAPRO) 10 MG tablet; Take 1 tablet (10 mg) by mouth daily  Dispense: 90 tablet; Refill: 3    4. Hyperlipidemia LDL goal <130  discussed with patient (or patient's parents/caregiver) pathophysiology of condition and treatment options.  reviewed labs, medications, diet ,etc.    - Comprehensive metabolic panel (BMP + Alb, Alk Phos, ALT, AST, Total. Bili, TP)  - Lipid panel reflex to direct LDL Fasting    5. Prostate cancer screening  - PSA, screen    6. Anxiety  as above. Well controlled on current medication(s). continue  follow-up and serotonin specific reuptake inhibitor, etc. Patient verbalized understanding and is agreeable to this plan.       COUNSELING:  Reviewed preventive health counseling, as reflected in patient instructions    BP Readings from Last 1 Encounters:   10/29/18 112/80     Estimated body mass index is 32.5 kg/m  as calculated from the following:    Height as of 7/22/18: 1.905 m (6' 3\").    Weight as of 7/22/18: 117.9 kg (260 lb).           reports that  has never smoked. he has never used smokeless tobacco.      Counseling Resources:  ATP IV Guidelines  Pooled Cohorts Equation Calculator  Breast Cancer Risk Calculator  FRAX Risk Assessment  ICSI Preventive Guidelines  Dietary Guidelines for Americans, 2010  USDA's MyPlate  ASA Prophylaxis  Lung CA Screening    I have discussed with patient the risks, benefits, medications, treatment options and modalities. "   I have instructed the patient to call or schedule a follow-up appointment if any problems or failure to improve.         Jethro Villalba MD  [facility]

## 2019-01-22 NOTE — PATIENT INSTRUCTIONS
"  Preventive Health Recommendations  Male Ages 50 - 64    Yearly exam:             See your health care provider every year in order to  o   Review health changes.   o   Discuss preventive care.    o   Review your medicines if your doctor has prescribed any.     Have a cholesterol test every 5 years, or more frequently if you are at risk for high cholesterol/heart disease.     Have a diabetes test (fasting glucose) every three years. If you are at risk for diabetes, you should have this test more often.     Have a colonoscopy at age 50, or have a yearly FIT test (stool test). These exams will check for colon cancer.      Talk with your health care provider about whether or not a prostate cancer screening test (PSA) is right for you.    You should be tested each year for STDs (sexually transmitted diseases), if you re at risk.     Shots: Get a flu shot each year. Get a tetanus shot every 10 years.     Nutrition:    Eat at least 5 servings of fruits and vegetables daily.     Eat whole-grain bread, whole-wheat pasta and brown rice instead of white grains and rice.     Get adequate Calcium and Vitamin D.     Lifestyle    Exercise for at least 150 minutes a week (30 minutes a day, 5 days a week). This will help you control your weight and prevent disease.     Limit alcohol to one drink per day.     No smoking.     Wear sunscreen to prevent skin cancer.     See your dentist every six months for an exam and cleaning.     See your eye doctor every 1 to 2 years.            Patient Education   Forms of Depression  Depression can happen to anyone--man or woman, young or old. Sometimes it occurs for a reason, such as illness or grief. At other times, no cause can be found.  If you have depression, you cannot simply change your attitude or \"pull yourself out of it.\" You need treatment from a doctor, a therapist, or both.   The following is a list of the most common types of depression.  Reactive depression  Also called " "\"adjustment disorder with depressed mood.\" Symptoms of depression occur in response to major stress. For example, job loss, moving, the death of a loved one or a troubled relationship all can cause symptoms.  Dysthymia   Dysthymia is mild depression that lasts for at least two years (or at least one year in children). You may feel sad and tired almost every day. You might have low self-esteem. You may worry that you will never feel \"normal\" again.  Major depression   When symptoms last for at least two weeks and seem to have no cause, it is called severe depression. This is a serious illness that affects your mood, your thoughts and even your body. It changes the way you eat, sleep and interact with others. If symptoms are not treated, they can last for months or even years.  Postpartum depression  Postpartum depression may occur in women who have just had a baby. Symptoms last more than two weeks. They may be mild or severe.  Seasonal affective disorder (SAD)  SAD is a type of depression that occurs in the winter months, when there is less sunlight. Symptoms may begin in the late fall, peaking in the winter. When spring comes and the days grow longer, you start to feel better.   Bipolar disorder  Bipolar disorder causes severe mood swings that affect your thoughts, behavior and the way you function in life. This illness used to be called manic-depressive disorder. In the \"manic\" phase, you have lots of energy. You might talk a lot, sleep very little and act in reckless ways. In the \"depressed\" phase, you feel sad and low.   continued  Mood disorder caused by physical illness   Certain illnesses create changes in the body that can cause symptoms of depression. Examples include stroke, seizure disorders, Parkinson's disease and some cancers. This is not the same as depression that might occur when you are coping with medical problems.  Depression caused by alcohol and other drugs   Illegal drugs, alcohol and certain " medicines can all cause symptoms of depression.   Where can I get more information?  [facility]: [phone]   ([facility]) or [phone] ([facility]: [phone]   or [phone]  Depression and Bipolar Support [place]:   [phone], www.dbsalliance.org  National [place] of Mental Health:   294-962-5029, www.nimh.nih.gov   National [place] on Mental Illness:   561-101-QPJS [6564], www.TANIA.org  National Mental Health Association:   663-388-GAOC [6642], www.NMHA.org  For informational purposes only. Not to replace the advice of your health care provider.   Copyright   2006 [facility]. All rights reserved.   Clinically reviewed by Gloria Kennedy. Cerevo [id number] - REV 04/18.

## 2019-01-23 ASSESSMENT — ANXIETY QUESTIONNAIRES: GAD7 TOTAL SCORE: 0

## 2019-02-05 NOTE — PROGRESS NOTES
"                                           Progress Note    Client Name: Sang Mtz  Date: 01/22/2019         Service Type: Individual      Session Start Time: 2:00 pm  Session End Time: 3:00 pm      Session Length: 60 minutes     Session #: 3     Attendees: Client and Spouse / Significant Other    Treatment Plan Last Reviewed: due on 02/20/2019  PHQ-9: 0  ROLAN-7: 0     DATA      Progress Since Last Session (Related to Symptoms / Goals / Homework):   Symptoms: Improving - No symptoms associated with depressed mood reported by client and evidenced by PHQ-9 and ROLAN-7 scores. Writer compared today's scores to scores from an appointment that occurred earlier in the day. Client endorsed symptoms on the PHQ-9 form he completed earler in the day. Writer plans to discuss scores with client at next appointment.    Homework: Partially completed      Episode of Care Goals: Satisfactory progress - PREPARATION (Decided to change - considering how); Intervened by negotiating a change plan and determining options / strategies for behavior change, identifying triggers, exploring social supports, and working towards setting a date to begin behavior change     Current / Ongoing Stressors and Concerns:   Met with client today for a return visit. Client's wife was also present for today's visit to provide collateral information about the client's concerns. Client's wife was able to share the concerns she had, which she expressed as wanting the client to be \"happy\" and feel like there is meaning / motivation to his life. Included client in the conversation and process the concerns his wife raised.       Treatment Objective(s) Addressed in This Session:    - identify at least six strategies to more effectively address stressors  - participate in social activities to build social connections     Intervention:   - Assisted client with processing his feelings and thoughts about the concerns raised by his wife.   - Explored with " client different social activities he can explore to build social connections (i.e., has a get together coming up with a group of friends).   - Clarified areas of focus for therapy, including exploring what might contribute to client's issues with connecting with others.     ASSESSMENT: Current Emotional / Mental Status (status of significant symptoms):   Risk status (Self / Other harm or suicidal ideation)   Client denies current fears or concerns for personal safety.   Client denies current or recent suicidal ideation or behaviors.   Client denies current or recent homicidal ideation or behaviors.   Client denies current or recent self injurious behavior or ideation.   Client denies other safety concerns.   Client Client reports there has been no change in risk factors since their last session.     Client Client reports there has been no change in protective factors since their last session.     A safety and risk management plan has not been developed at this time, however client was given the after-hours number / 911 should there be a change in any of these risk factors.     Appearance:   Appropriate    Eye Contact:   Fair    Psychomotor Behavior: Normal    Attitude:   Cooperative  Guarded    Orientation:   All   Speech    Rate / Production: Normal     Volume:  Normal    Mood:    Normal   Affect:    Appropriate    Thought Content:  Clear    Thought Form:   Coherent  Logical    Insight:    Fair      Medication Review:   No changes to current psychiatric medication(s)     Medication Compliance:   Yes     Changes in Health Issues:   None reported     Chemical Use Review:   Substance Use: Chemical use reviewed, no active concerns identified      Tobacco Use: No current tobacco use.       Collateral Reports Completed:   Not Applicable    PLAN: (Client Tasks / Therapist Tasks / Other)  - Client will engage in self care activities at least once a day.  - Client will explore at least one social gathering or opportunity  at least once a week.        Geovanna Husain LICSW                                                         ________________________________________________________________________    Treatment Plan    Client's Name: Sang Mtz  YOB: 1960    Date: 01/22/2019    DSM-V Diagnoses: Adjustment Disorders  309.0 (F43.21) With depressed mood  Psychosocial / Contextual Factors: Semi halfway; phase of life issues  WHODAS: 16    Referral / Collaboration:  Referral to another professional/service is not indicated at this time..    Anticipated number of session or this episode of care: 10      MeasurableTreatment Goal(s) related to diagnosis / functional impairment(s)  Goal 1: Client will return to previous functioning as evidenced in a PHQ-9 score of less than 5 for two consecutive visits.    I will know I've met my goal when feeling more fulfilled.      Objective #A (Client Action)    Client will identify at least six coping strategies to more effectively address stressors.  Status: New - Date: 01/22/2019     Intervention(s)  Therapist will assign homework to practice and utilize as needed the coping strategies know to effectively improve mood.  provide educational materials on different best practice coping strategies  teach different coping strategies.      Client has reviewed and agreed to the above plan.      Geovanna Husain LICSW

## 2019-02-15 ENCOUNTER — MYC MEDICAL ADVICE (OUTPATIENT)
Dept: PEDIATRICS | Facility: CLINIC | Age: 59
End: 2019-02-15

## 2019-02-23 DIAGNOSIS — F32.0 MAJOR DEPRESSIVE DISORDER, SINGLE EPISODE, MILD (H): ICD-10-CM

## 2019-02-24 NOTE — TELEPHONE ENCOUNTER
"Requested Prescriptions   Pending Prescriptions Disp Refills     escitalopram (LEXAPRO) 10 MG tablet [Pharmacy Med Name: ESCITALOPRAM  10MG  TAB]  Last Written Prescription Date:  01/22/2019  Last Fill Quantity: 90 tablet,  # refills: 3   Last office visit: 1/22/2019 with prescribing provider:  Jethro Villalba MD    Future Office Visit:   Next 5 appointments (look out 90 days)    Mar 19, 2019 12:00 PM CDT  Return Visit with Geovanna Husain [facility] [place] ([facility] [place]) [address]  [address]  [place] [address]  [phone]          90 tablet 3     Sig: TAKE 1 TABLET BY MOUTH  DAILY    SSRIs Protocol Passed - 2/23/2019  8:32 PM       Passed - PHQ-9 score less than 5 in past 6 months    Please review last PHQ-9 score.   PHQ-9 SCORE 12/12/2018 1/22/2019 1/22/2019   PHQ-9 Total Score MyChart - - -   PHQ-9 Total Score 1 3 0     ROLAN-7 SCORE 1/16/2018 12/12/2018 1/22/2019   Total Score - - -   Total Score 0 0 0              Passed - Medication is active on med list       Passed - Patient is age 18 or older       Passed - Recent (6 mo) or future (30 days) visit within the authorizing provider's specialty    Patient had office visit in the last 6 months or has a visit in the next 30 days with authorizing provider or within the authorizing provider's specialty.  See \"Patient Info\" tab in inbasket, or \"Choose Columns\" in Meds & Orders section of the refill encounter.              "

## 2019-02-25 ENCOUNTER — ALLIED HEALTH/NURSE VISIT (OUTPATIENT)
Dept: NURSING | Facility: CLINIC | Age: 59
End: 2019-02-25
Payer: COMMERCIAL

## 2019-02-25 DIAGNOSIS — Z23 NEED FOR SHINGLES VACCINE: Primary | ICD-10-CM

## 2019-02-25 PROCEDURE — 90471 IMMUNIZATION ADMIN: CPT

## 2019-02-25 PROCEDURE — 99207 ZZC NO CHARGE NURSE ONLY: CPT

## 2019-02-25 PROCEDURE — 90750 HZV VACC RECOMBINANT IM: CPT

## 2019-02-26 RX ORDER — ESCITALOPRAM OXALATE 10 MG/1
TABLET ORAL
Qty: 90 TABLET | Refills: 2 | Status: SHIPPED | OUTPATIENT
Start: 2019-02-26 | End: 2019-10-25

## 2019-02-26 NOTE — TELEPHONE ENCOUNTER
Called the Pt to check if he wants this script sent to mail order (previously sent to our pharmacy)  He confirmed he does want this. Sent script.     Yazmin Galvin RN -- [facility]

## 2019-05-09 ENCOUNTER — MYC MEDICAL ADVICE (OUTPATIENT)
Dept: PEDIATRICS | Facility: CLINIC | Age: 59
End: 2019-05-09

## 2019-05-10 ENCOUNTER — MYC MEDICAL ADVICE (OUTPATIENT)
Dept: PEDIATRICS | Facility: CLINIC | Age: 59
End: 2019-05-10

## 2019-05-17 DIAGNOSIS — K21.9 GASTROESOPHAGEAL REFLUX DISEASE WITHOUT ESOPHAGITIS: ICD-10-CM

## 2019-05-18 NOTE — TELEPHONE ENCOUNTER
"Requested Prescriptions   Pending Prescriptions Disp Refills     omeprazole (PRILOSEC) 40 MG DR capsule [Pharmacy Med Name: OMEPRAZOLE  40MG  CAP]  Last Written Prescription Date:  09/18/2018  Last Fill Quantity: 90 capsule,  # refills: 2    Last Office Visit: 1/22/2019 Jethro Villalba MD       Future Office Visit:      90 capsule 2     Sig: TAKE 1 CAPSULE BY MOUTH  DAILY 30 TO 60 MINUTES  BEFORE A MEAL       PPI Protocol Passed - 5/17/2019  8:29 PM        Passed - Not on Clopidogrel (unless Pantoprazole ordered)        Passed - No diagnosis of osteoporosis on record        Passed - Recent (12 mo) or future (30 days) visit within the authorizing provider's specialty     Patient had office visit in the last 12 months or has a visit in the next 30 days with authorizing provider or within the authorizing provider's specialty.  See \"Patient Info\" tab in inbasket, or \"Choose Columns\" in Meds & Orders section of the refill encounter.              Passed - Medication is active on med list        Passed - Patient is age 18 or older          "

## 2019-05-20 RX ORDER — OMEPRAZOLE 40 MG/1
CAPSULE, DELAYED RELEASE ORAL
Qty: 90 CAPSULE | Refills: 1 | Status: SHIPPED | OUTPATIENT
Start: 2019-05-20 | End: 2019-10-07

## 2019-05-20 NOTE — TELEPHONE ENCOUNTER
Prescription approved per [facility] Refill Protocol.  Tanisha Mcdonald RN

## 2019-06-24 ENCOUNTER — MYC MEDICAL ADVICE (OUTPATIENT)
Dept: PEDIATRICS | Facility: CLINIC | Age: 59
End: 2019-06-24

## 2019-06-24 NOTE — TELEPHONE ENCOUNTER
Patient was called and was told we do not have the shingles vaccine at the clinic. He cannot get it at the pharmacy. Patient will call back in July.

## 2019-06-25 NOTE — TELEPHONE ENCOUNTER
Spoke with Pt and he stated he will receive vaccine and wife clinic and submit to us via my chart.  Donna QUINTERO, CMA,AAMA

## 2019-06-26 ENCOUNTER — MYC MEDICAL ADVICE (OUTPATIENT)
Dept: PEDIATRICS | Facility: CLINIC | Age: 59
End: 2019-06-26

## 2019-06-26 NOTE — TELEPHONE ENCOUNTER
Patient received second shingles in [facility], recorded on imm record.  Leena Orta RN  Message handled by Nurse Triage.

## 2019-08-25 ENCOUNTER — FCC EXTENDED DOCUMENTATION (OUTPATIENT)
Dept: PSYCHOLOGY | Facility: CLINIC | Age: 59
End: 2019-08-25

## 2019-08-25 NOTE — PROGRESS NOTES
Discharge Summary  Multiple Sessions    Client Name: Sang Mtz MRN#: [id number] YOB: 1960    Intake / Discharge Date: 11/20/2018 / 08/25/2019      DSM5 Diagnoses: (Sustained by DSM5 Criteria Listed Above)  Diagnoses: Adjustment Disorders  309.0 (F43.21) With depressed mood  Psychosocial & Contextual Factors: semi retried, relationship issues.  WHODAS 2.0 (12 item) Score: na          Presenting Concern:  Adjustment issues.      Reason for Discharge:  Client did not return      Disposition at Time of Last Encounter:   Comments:   Stable     Risk Management:   Client denies a history of suicidal ideation, suicide attempts, self-injurious behavior, homicidal ideation, homicidal behavior and and other safety concerns  Recommended that patient call 911 or go to the local ED should there be a change in any of these risk factors.      Referred To:  Return to therapy as needed.        Geovanna Husain, [facility]   8/25/2019

## 2019-09-28 ENCOUNTER — HEALTH MAINTENANCE LETTER (OUTPATIENT)
Age: 59
End: 2019-09-28

## 2019-10-09 DIAGNOSIS — F32.0 MAJOR DEPRESSIVE DISORDER, SINGLE EPISODE, MILD (H): ICD-10-CM

## 2019-10-10 RX ORDER — ESCITALOPRAM OXALATE 10 MG/1
TABLET ORAL
Qty: 90 TABLET | Refills: 2 | OUTPATIENT
Start: 2019-10-10

## 2019-10-10 NOTE — TELEPHONE ENCOUNTER
"Requested Prescriptions   Pending Prescriptions Disp Refills     escitalopram (LEXAPRO) 10 MG tablet [Pharmacy Med Name: ESCITALOPRAM  10MG  TAB]    Last Written Prescription Date:  2/26/2019  Last Fill Quantity: 90,  # refills: 2  Last office visit: 1/22/2019 with prescribing provider:  Jethro Villalba     Future Office Visit:     90 tablet 2     Sig: TAKE 1 TABLET BY MOUTH  DAILY       SSRIs Protocol Failed - 10/9/2019  8:37 PM        Failed - PHQ-9 score less than 5 in past 6 months     Please review last PHQ-9 score.     PHQ-9 SCORE 12/12/2018 1/22/2019 1/22/2019   PHQ-9 Total Score MyChart - - -   PHQ-9 Total Score 1 3 0     ROLAN-7 SCORE 1/16/2018 12/12/2018 1/22/2019   Total Score - - -   Total Score 0 0 0               Failed - Recent (6 mo) or future (30 days) visit within the authorizing provider's specialty     Patient had office visit in the last 6 months or has a visit in the next 30 days with authorizing provider or within the authorizing provider's specialty.  See \"Patient Info\" tab in inbasket, or \"Choose Columns\" in Meds & Orders section of the refill encounter.            Passed - Medication is active on med list        Passed - Patient is age 18 or older            "

## 2019-10-25 DIAGNOSIS — F32.0 MAJOR DEPRESSIVE DISORDER, SINGLE EPISODE, MILD (H): ICD-10-CM

## 2019-10-26 NOTE — TELEPHONE ENCOUNTER
"Requested Prescriptions   Pending Prescriptions Disp Refills     escitalopram (LEXAPRO) 10 MG tablet [Pharmacy Med Name: ESCITALOPRAM  10MG  TAB]  Last Written Prescription Date:  02/26/2019  Last Fill Quantity: 90 tablet,  # refills: 2   Last Office Visit: 1/22/2019 Jethro Villalba MD   Future Office Visit:      90 tablet 2     Sig: TAKE 1 TABLET BY MOUTH  DAILY       SSRIs Protocol Failed - 10/25/2019  8:26 PM        Failed - PHQ-9 score less than 5 in past 6 months     Please review last PHQ-9 score.   PHQ-9 SCORE 12/12/2018 1/22/2019 1/22/2019   PHQ-9 Total Score MyChart - - -   PHQ-9 Total Score 1 3 0     ROLAN-7 SCORE 1/16/2018 12/12/2018 1/22/2019   Total Score - - -   Total Score 0 0 0             Failed - Recent (6 mo) or future (30 days) visit within the authorizing provider's specialty     Patient had office visit in the last 6 months or has a visit in the next 30 days with authorizing provider or within the authorizing provider's specialty.  See \"Patient Info\" tab in inbasket, or \"Choose Columns\" in Meds & Orders section of the refill encounter.            Passed - Medication is active on med list        Passed - Patient is age 18 or older          "

## 2019-10-27 ENCOUNTER — MYC MEDICAL ADVICE (OUTPATIENT)
Dept: NURSING | Facility: CLINIC | Age: 59
End: 2019-10-27

## 2019-10-28 ASSESSMENT — PATIENT HEALTH QUESTIONNAIRE - PHQ9
10. IF YOU CHECKED OFF ANY PROBLEMS, HOW DIFFICULT HAVE THESE PROBLEMS MADE IT FOR YOU TO DO YOUR WORK, TAKE CARE OF THINGS AT HOME, OR GET ALONG WITH OTHER PEOPLE: NOT DIFFICULT AT ALL
SUM OF ALL RESPONSES TO PHQ QUESTIONS 1-9: 0
SUM OF ALL RESPONSES TO PHQ QUESTIONS 1-9: 0

## 2019-10-28 NOTE — TELEPHONE ENCOUNTER
Refill Request:   Lexapro 10 mg    Last Written Prescription Date:  2/26/19  Last Fill Quantity: 90,  # refills: 2   Last office visit: 1/22/2019 with prescribing provider:  Dr. Villalba.   Future Office Visit:    None currently scheduled.     PHQ-9 SCORE 1/22/2019 1/22/2019 10/28/2019   PHQ-9 Total Score MyChart - - 0   PHQ-9 Total Score 3 0 0     Last office visit 1/22/19.   Routing to provider to advise on refill.

## 2019-10-29 RX ORDER — ESCITALOPRAM OXALATE 10 MG/1
TABLET ORAL
Qty: 90 TABLET | Refills: 2 | Status: SHIPPED | OUTPATIENT
Start: 2019-10-29 | End: 2020-06-11

## 2019-10-29 ASSESSMENT — PATIENT HEALTH QUESTIONNAIRE - PHQ9: SUM OF ALL RESPONSES TO PHQ QUESTIONS 1-9: 0

## 2019-12-03 NOTE — PROGRESS NOTES
"Pre-Visit Planning     Future Appointments   Date Time Provider Department Center   12/4/2019  1:00 PM Jethro Villalba MD [facility]     Arrival Time for this Appointment:  1:00 PM   Appointment Notes for this encounter:   f/u UC ok book per pcp     Questionnaires Reviewed/Assigned  No additional questionnaires are needed     Patient preferred phone number: [phone]    Spoke to patient via phone. Patient does not have additional questions or concerns.        Visit is not preventive.    Health Maintenance Due   Topic Date Due     ANNUAL REVIEW OF HM ORDERS  1960     DEPRESSION ACTION PLAN  1960     HIV SCREENING  07/24/1973     Patient is due for:  No appointment needed.    [employer]  Patient is active on [employer].    Questionnaire Review   Advised patient to arrive early in order to complete questionnaires.    Call Summary  \"Thank you for your time today.  If anything comes up before your appointment, please feel free to contact us at [phone].\"    Foreign King, EMT at 3:29 PM on December 3, 2019   Clinic Health Guide   [phone]  "

## 2019-12-04 ENCOUNTER — OFFICE VISIT (OUTPATIENT)
Dept: PEDIATRICS | Facility: CLINIC | Age: 59
End: 2019-12-04
Payer: COMMERCIAL

## 2019-12-04 VITALS
OXYGEN SATURATION: 95 % | TEMPERATURE: 98.8 F | HEART RATE: 80 BPM | SYSTOLIC BLOOD PRESSURE: 128 MMHG | WEIGHT: 271 LBS | RESPIRATION RATE: 20 BRPM | DIASTOLIC BLOOD PRESSURE: 82 MMHG | BODY MASS INDEX: 33.87 KG/M2

## 2019-12-04 DIAGNOSIS — J18.9 COMMUNITY ACQUIRED PNEUMONIA OF RIGHT LOWER LOBE OF LUNG: Primary | ICD-10-CM

## 2019-12-04 DIAGNOSIS — R93.89 ABNORMAL CXR (CHEST X-RAY): ICD-10-CM

## 2019-12-04 PROCEDURE — 99214 OFFICE O/P EST MOD 30 MIN: CPT | Performed by: INTERNAL MEDICINE

## 2019-12-04 RX ORDER — INFLUENZA A VIRUS A/GUANGDONG-MAONAN/SWL1536/2019 CNIC-1909 (H1N1) ANTIGEN (FORMALDEHYDE INACTIVATED), INFLUENZA A VIRUS A/HONG KONG/2671/2019 (H3N2) ANTIGEN (FORMALDEHYDE INACTIVATED), INFLUENZA B VIRUS B/PHUKET/3073/2013 ANTIGEN (FORMALDEHYDE INACTIVATED), AND INFLUENZA B VIRUS B/WASHINGTON/02/2019 ANTIGEN (FORMALDEHYDE INACTIVATED) 15; 15; 15; 15 UG/.5ML; UG/.5ML; UG/.5ML; UG/.5ML
INJECTION, SUSPENSION INTRAMUSCULAR
Refills: 0 | COMMUNITY
Start: 2019-09-26 | End: 2020-11-18

## 2019-12-04 RX ORDER — DOXYCYCLINE 100 MG/1
100 CAPSULE ORAL DAILY
Qty: 4 CAPSULE | Refills: 0 | Status: SHIPPED | OUTPATIENT
Start: 2019-12-04 | End: 2019-12-08

## 2019-12-04 RX ORDER — DOXYCYCLINE 100 MG/1
100 CAPSULE ORAL
COMMUNITY
Start: 2019-11-29 | End: 2019-12-04

## 2019-12-04 ASSESSMENT — PATIENT HEALTH QUESTIONNAIRE - PHQ9
5. POOR APPETITE OR OVEREATING: NOT AT ALL
SUM OF ALL RESPONSES TO PHQ QUESTIONS 1-9: 0

## 2019-12-04 ASSESSMENT — ANXIETY QUESTIONNAIRES
1. FEELING NERVOUS, ANXIOUS, OR ON EDGE: SEVERAL DAYS
3. WORRYING TOO MUCH ABOUT DIFFERENT THINGS: NOT AT ALL
2. NOT BEING ABLE TO STOP OR CONTROL WORRYING: SEVERAL DAYS
IF YOU CHECKED OFF ANY PROBLEMS ON THIS QUESTIONNAIRE, HOW DIFFICULT HAVE THESE PROBLEMS MADE IT FOR YOU TO DO YOUR WORK, TAKE CARE OF THINGS AT HOME, OR GET ALONG WITH OTHER PEOPLE: NOT DIFFICULT AT ALL
6. BECOMING EASILY ANNOYED OR IRRITABLE: NOT AT ALL
5. BEING SO RESTLESS THAT IT IS HARD TO SIT STILL: NOT AT ALL
GAD7 TOTAL SCORE: 2
7. FEELING AFRAID AS IF SOMETHING AWFUL MIGHT HAPPEN: NOT AT ALL

## 2019-12-04 NOTE — PATIENT INSTRUCTIONS
Patient Education     Pneumonia (Adult)  Pneumonia is an infection deep within the lungs. It is in the small air sacs (alveoli). Pneumonia may be caused by a virus or bacteria. Pneumonia caused by bacteria is usually treated with an antibiotic. Severe cases may need to be treated in the hospital. Milder cases can be treated at home. Symptoms usually start to get better during the first 2 days of treatment.    Home care  Follow these guidelines when caring for yourself at home:    Rest at home for the first 2 to 3 days, or until you feel stronger. Don t let yourself get overly tired when you go back to your activities.    Stay away from cigarette smoke - yours or other people s.    You may use acetaminophen or ibuprofen to control fever or pain, unless another medicine was prescribed. If you have chronic liver or kidney disease, talk with your healthcare provider before using these medicines. Also talk with your provider if you ve had a stomach ulcer or gastrointestinal bleeding. Don t give aspirin to anyone younger than 18 years of age who is ill with a fever. It may cause severe liver damage.    Your appetite may be poor, so a light diet is fine.    Drink 6 to 8 glasses of fluids every day to make sure you are getting enough fluids. Beverages can include water, sport drinks, sodas without caffeine, juices, tea, or soup. Fluids will help loosen secretions in the lung. This will make it easier for you to cough up the phlegm (sputum). If you also have heart or kidney disease, check with your healthcare provider before you drink extra fluids.    Take antibiotic medicine prescribed until it is all gone, even if you are feeling better after a few days.  Follow-up care  Follow up with your healthcare provider in the next 2 to 3 days, or as advised. This is to be sure the medicine is helping you get better.  If you are 65 or older, you should get a pneumococcal vaccine and a yearly flu (influenza) shot. You should also  get these vaccines if you have chronic lung disease like asthma, emphysema, or COPD. Recently, a second type of pneumonia vaccine has become available for everyone over 65 years old. This is in addition to the previous vaccine. Ask your provider about this.  When to seek medical advice  Call your healthcare provider right away if any of these occur:    You don t get better within the first 48 hours of treatment    Shortness of breath gets worse    Rapid breathing (more than 25 breaths per minute)    Coughing up blood    Chest pain gets worse with breathing    Fever of 100.4 F (38 C) or higher that doesn t get better with fever medicine    Weakness, dizziness, or fainting that gets worse    Thirst or dry mouth that gets worse    Sinus pain, headache, or a stiff neck    Chest pain not caused by coughing  Date Last Reviewed: 1/1/2017 2000-2018 [employer]. [address] [place] [address], [place] [address]. All rights reserved. This information is not intended as a substitute for professional medical care. Always follow your healthcare professional's instructions.

## 2019-12-04 NOTE — PROGRESS NOTES
SUBJECTIVE:                                                    Sang Mtz is a 59 year old male who presents to clinic today for the following health issues:    ED/UC Followup:    Facility:  [facility]  Date of visit: 11/29/2019  Reason for visit: cough, fever and fatigue. Pt was Dx with pneumonaie  Current Status: Feeling much better     patient here for emergency room follow-up, doing wbeter but cough persits, just started feelgi beter in the last 2 days, worried if antibiotics stop as planned he may get worse again. no more vomiting, appetei is good, trying to rest. no more fevers. no side effects of med noted. meier incidental finding on CXR at outside efre ? AAA vs other. no Fhx, No other concerns or complaints today.       Problem list and histories reviewed & adjusted, as indicated.  Additional history: as documented    Patient Active Problem List    Diagnosis Date Noted     Major depressive disorder, single episode, mild (H) 01/31/2017     Priority: Medium     ACP (advance care planning) 12/15/2015     Priority: Medium     Advance Care Planning 12/15/2015: ACP Review of Chart / Resources Provided:  Reviewed chart for advance care plan.  Sang Mtz has no plan or code status on file. Discussed available resources and provided with information. Advance Care Planning invitation sent. Code status updated and sent to provider for review pending further ACP discussions.  Confirmed/documented legally designated decision maker(s). Added by Rachele Junior             Esophageal reflux 11/18/2014     Priority: Medium     Splenomegaly 02/08/2012     Priority: Medium     Anxiety 02/08/2012     Priority: Medium     Kidney stone 02/08/2012     Priority: Medium     Liver nodule 02/08/2012     Priority: Medium     Mass of lung 02/08/2012     Priority: Medium     Erectile dysfunction 09/13/2010     Priority: Medium     Hypertriglyceridemia 09/13/2010     Priority: Medium     HYPERLIPIDEMIA  LDL GOAL <130 02/10/2010     Priority: Medium     Social History     Tobacco Use     Smoking status: Never Smoker     Smokeless tobacco: Never Used   Substance Use Topics     Alcohol use: No     Drug use: No     Family History   Problem Relation Age of Onset     Hypertension Father      Cancer Father         cml     Cancer - colorectal Father         diagnosis at age 75     C.A.D. Maternal Grandfather         late     Eye Disorder No family hx of         no glaucoma       ROS:  A complete 10 point review of systems was taken and negative except for those noted in the subjective/HPI section(s) above     BP Readings from Last 3 Encounters:   12/04/19 128/82   01/22/19 118/84   10/29/18 112/80    Wt Readings from Last 3 Encounters:   12/04/19 122.9 kg (271 lb)   01/22/19 120.5 kg (265 lb 11.2 oz)   07/22/18 117.9 kg (260 lb)                    OBJECTIVE:                                                    /82   Pulse 80   Temp 98.8  F (37.1  C) (Oral)   Resp 20   Wt 122.9 kg (271 lb)   SpO2 95%   BMI 33.87 kg/m     Constitutional: healthy, alert and no distress  HEENT: normocephalic and atrumatic, mucous membranes moist, op clear, mucous membranes moist, neck supple   Cardiovascular: regular rate and rhythm no rubs, gallops or murmurs normal S1/S2; no S3 or S4  Respiratory: clear to auscultation bilaterally in all lung fields, normal insp/exp effort. Good air movement  Gastrointestinal: Abdomen soft, non-tender. BS normal. No masses, organomegaly  Musculoskeletal: extremities normal- no gross deformities noted, gait normal and normal muscle tone  Skin: no suspicious lesions or rashes  Neurologic: Gait normal. Reflexes normal and symmetric. Sensation grossly WNL.  Psychiatric: mentation appears normal and affect normal/bright       ASSESSMENT/PLAN:                                                    (J18.1) Community acquired pneumonia of right lower lobe of lung (H)  (primary encounter diagnosis)  Comment:  "discussed with patient (or patient's parents/caregiver) pathophysiology of condition and treatment options.  patient improving the doxy, given duration and severity of symptom will increase duration to 10 days (from 7), patient very supportive of this plan. New medication(s) indication(s), adverse effects, risks and benefits discussed. if not resolved before antibiotics done, would want patient to return to clinic for reimaging, etc, sooner if worse or ANY new symptoms develop. Patient verbalized understanding and is agreeable to this plan.   Plan: doxycycline monohydrate (MONODOX) 100 MG         capsule          (R93.89) Abnormal CXR (chest x-ray)  Comment: as above. will get us to evaluation for possible AAA. Patient verbalized understanding and is agreeable to this plan.   Plan: US Abdominal Aorta Imaging, doxycycline         monohydrate (MONODOX) 100 MG capsule              Estimated body mass index is 33.21 kg/m  as calculated from the following:    Height as of 1/22/19: 1.905 m (6' 3\").    Weight as of 1/22/19: 120.5 kg (265 lb 11.2 oz).      Return to clinic as needed or if symptoms persist, change, worsen or if any new symptoms develop.        Jethro Villalba M.D.  Internal Medicine-Pediatrics              Answers for HPI/ROS submitted by the patient on 12/4/2019   Chronic problems general questions HPI Form  How many servings of fruits and vegetables do you eat daily?: 0-1  On average, how many sweetened beverages do you drink each day (Examples: soda, juice, sweet tea, etc.  Do NOT count diet or artificially sweetened beverages)?: 3  How many days per week do you miss taking your medication?: 0    "

## 2019-12-05 ASSESSMENT — ANXIETY QUESTIONNAIRES: GAD7 TOTAL SCORE: 2

## 2019-12-10 ENCOUNTER — ANCILLARY PROCEDURE (OUTPATIENT)
Dept: ULTRASOUND IMAGING | Facility: CLINIC | Age: 59
End: 2019-12-10
Attending: INTERNAL MEDICINE
Payer: COMMERCIAL

## 2019-12-10 DIAGNOSIS — R93.89 ABNORMAL CXR (CHEST X-RAY): ICD-10-CM

## 2019-12-10 PROCEDURE — 76775 US EXAM ABDO BACK WALL LIM: CPT | Performed by: RADIOLOGY

## 2020-03-15 ENCOUNTER — HEALTH MAINTENANCE LETTER (OUTPATIENT)
Age: 60
End: 2020-03-15

## 2020-03-25 DIAGNOSIS — K21.9 GASTROESOPHAGEAL REFLUX DISEASE WITHOUT ESOPHAGITIS: ICD-10-CM

## 2020-03-26 RX ORDER — OMEPRAZOLE 40 MG/1
CAPSULE, DELAYED RELEASE ORAL
Qty: 90 CAPSULE | Refills: 1 | Status: SHIPPED | OUTPATIENT
Start: 2020-03-26 | End: 2020-09-14

## 2020-05-27 ENCOUNTER — VIRTUAL VISIT (OUTPATIENT)
Dept: PEDIATRICS | Facility: CLINIC | Age: 60
End: 2020-05-27
Payer: COMMERCIAL

## 2020-05-27 DIAGNOSIS — M54.50 RIGHT-SIDED LOW BACK PAIN WITHOUT SCIATICA, UNSPECIFIED CHRONICITY: ICD-10-CM

## 2020-05-27 DIAGNOSIS — Z20.822 SUSPECTED COVID-19 VIRUS INFECTION: Primary | ICD-10-CM

## 2020-05-27 PROCEDURE — 99213 OFFICE O/P EST LOW 20 MIN: CPT | Mod: 95 | Performed by: INTERNAL MEDICINE

## 2020-05-27 RX ORDER — ASCORBIC ACID 500 MG
500 TABLET ORAL 2 TIMES DAILY
COMMUNITY
End: 2023-12-06

## 2020-05-27 ASSESSMENT — PATIENT HEALTH QUESTIONNAIRE - PHQ9: SUM OF ALL RESPONSES TO PHQ QUESTIONS 1-9: 0

## 2020-05-27 NOTE — PATIENT INSTRUCTIONS
"  Patient Education   COVID-19 Antibody Testing  Questions and Answers  You've been tested for COVID-19 (coronavirus) antibodies. Here are answers to some questions you may have.  When will my test result be ready?  Results are usually available within 7 days.   Where can I get my test result?    If you use [employer], you'll get a message from [employer] when your result is ready.    If you don't use [employer], you'll get a letter with your test results mailed to you. Please allow 7 to 10 days after the test to get your letter.  Please note: The place where you had the test won't get the results.   What is antibody testing?  This is a kind of blood test. We take a small sample of your blood, then test it for something called \"antibodies.\"   Your body makes antibodies to fight infection. If your blood has antibodies for a certain germ, it means you've been infected with that germ in the past.   Sometimes, antibodies stay in your body for years after you've had the infection. They can be there even if the germ didn't make you sick. They are a sign that your body fought off the infection.  Will this test find antibodies in everyone who's had COVID-19?  No. The test finds antibodies in most people 10 days after they get sick. For some people, it takes longer than 10 days for antibodies to show up. Others may never show antibodies against COVID-19, especially if they have weak immune systems.  What does it mean if my test finds COVID-19 antibodies?  If we find these antibodies, it suggests:     You have had the virus.    Your body's immune system fought the virus.  We don't know if this will help protect you from getting COVID-19 again. Scientists are still learning about this.  Where can I get more information?  To learn more, go to https://www.cdc.gov/coronavirus/2019-ncov/symptoms-testing/symptoms.html  For informational purposes only. Not to replace the advice of your health care provider. Copyright   2020 [employer] [facility] " Services. All rights reserved. Clinically reviewed by the [facility] COVID-19 Clinical Team. [employer] [id number] - 05/20.       Patient Education     Back Care Tips    Caring for your back  These are things you can do to prevent a recurrence of acute back pain and to reduce symptoms from chronic back pain:    Stay at a healthy weight. If you are overweight, losing weight will help most types of back pain.    Exercise is an important part of recovery from most types of back pain. The muscles behind and in front of the spine support the back. This means strengthening both the back muscles and the abdominal muscles will provide better support for your spine.     Swimming and brisk walking are good overall exercises to improve your fitness level.    Practice safe lifting methods (see below).    Practice good posture when sitting, standing, and walking. Don't sit for a long time. This puts more stress on the lower back than standing or walking.    Wear quality shoes with good arch support. Foot and ankle alignment can affect back symptoms. Don't wear high heels.    Therapeutic massage can help relax the back muscles without stretching them.    During the first 24 to 72 hours after an acute injury or flare-up of chronic back pain, put an ice pack on the painful area for 20 minutes and then remove it for 20 minutes. Do thisover a period of 60 to 90 minutes, or several times a day. As a safety precaution, don't use a heating pad at bedtime. Sleeping on a heating pad can lead to skin burns or tissue damage.    You can alternate using ice and heat.  Medicines  Talk with your healthcare provider before using medicines, especially if you have other health problems or are taking other medicines.    You may use over-the-counter medicines, such as acetaminophen, ibuprofen, or naprosyn to control pain, unless your healthcare provider prescribed other pain medicine. Talk with your healthcare provider before taking any medicines  if you have a chronic condition such ass diabetes, liver or kidney disease, stomach ulcers, or digestive bleeding, or are taking blood thinners.    Be careful if you are given prescription pain medicines, opioids, or medicine for muscle spasm. They can cause drowsiness, and affect your coordination, reflexes, and judgment. Don't drive or operate heavy machinery while taking these types of medicines. Take prescription pain medicine only as prescribed by your healthcare provider.  Lumbar stretch  This simple stretch will help relax muscle spasm and keep your back more limber. If exercise makes your back pain worse, don t do it.    Lie on your back with your knees bent and both feet on the ground.    Slowly raise your left knee to your chest as you flatten your lower back against the floor. Hold for 5 seconds.    Relax and repeat the exercise with your right knee.    Do 10 of these exercises for each leg.  Safe lifting method    Don t bend over at the waist to lift an object off the floor.  Instead, bend your knees and hips in a squat.     Keep your back and head upright    Hold the object close to your body, directly in front of you.    Straighten your legs to lift the object.     Lower the object to the floor in the reverse fashion.    If you must slide something across the floor, push it.    Posture tips  Sitting  Sit in chairs with straight backs or low-back support. Keep your knees lower than your hips, with your feet flat on the floor.  When driving, sit up straight. Adjust the seat forward so you are not leaning toward the steering wheel.  A small pillow or rolled towel behind your lower back may help if you are driving long distances.   Standing  When standing for long periods, shift most of your weight to one leg at a time. Switch legs every few minutes.   Sleeping  The best way to sleep is on your side with your knees bent. Put a low pillow under your head to support your neck in a neutral spine position.  Don't use thick pillows that bend your neck to one side. Put a pillow between your legs to further relax your lower back. If you sleep on your back, put pillows under your knees to support your legs in a slightly flexed position. Use a firm mattress. If your mattress sags, replace it, or use a 1/2-inch plywood board under the mattress to add support.  Follow-up care  Follow up with your healthcare provider, or as advised.  If X-rays, a CT scan or an MRI scan were taken, they may be reviewed by a radiologist. You will be told of any new findings that may affect your care.  Call 911  Call 911 if any of the following occur:    Trouble breathing    Confusion    Very drowsy    Fainting or loss of consciousness    Rapid or very slow heart rate    Loss of  bowel or bladder control  When to seek medical advice  Call your healthcare provider right away if any of the following occur:    Pain becomes worse or spreads to your arms or legs    Weakness or numbness in one or both arms or legs    Numbness in the groin area  Date Last Reviewed: 6/1/2016 2000-2019 [employer]. [address] [place] [address]. All rights reserved. This information is not intended as a substitute for professional medical care. Always follow your healthcare professional's instructions.

## 2020-05-27 NOTE — PROGRESS NOTES
"Sang Mtz is a 59 year old male who is being evaluated via a billable telephone visit.      The patient has been notified of following:     \"This telephone visit will be conducted via a call between you and your physician/provider. We have found that certain health care needs can be provided without the need for a physical exam.  This service lets us provide the care you need with a short phone conversation.  If a prescription is necessary we can send it directly to your pharmacy.  If lab work is needed we can place an order for that and you can then stop by our lab to have the test done at a later time.    Telephone visits are billed at different rates depending on your insurance coverage. During this emergency period, for some insurers they may be billed the same as an in-person visit.  Please reach out to your insurance provider with any questions.    If during the course of the call the physician/provider feels a telephone visit is not appropriate, you will not be charged for this service.\"    Patient has given verbal consent for Telephone visit?  Yes    What phone number would you like to be contacted at? [phone]    How would you like to obtain your AVS? MyChart    Subjective     Sang Mtz is a 59 year old male who presents via phone visit today for the following health issues:    HPI   Currently not having any symptoms. He reports taking Zinc 60 mg per day.    Pt did note that he had a diagnosis of pneumonia in 12/19. He has a cough, HA, fever, vomiting, sore throat, fatigue x 5 weeks of moderate intensity. Pt had hx of pneumonia over 10 years ago but notes this was worse. Did take antibiotics and symptoms resolved.    Pt did want to know if it would be appropriate for antibody testing.    also, some R low back pain, mild, achey, wondeering if due to sitting in new chair, hasnt' trtied any apap or ibuprofen. no fall or trauma. No other concerns or complaints today.           Reviewed " and updated as needed this visit by Provider         Review of Systems   Constitutional, HEENT, cardiovascular, pulmonary, gi and gu systems are negative, except as otherwise noted.       Objective   Reported vitals:  There were no vitals taken for this visit.   healthy, alert and no distress  PSYCH: Alert and oriented times 3; coherent speech, normal   rate and volume, able to articulate logical thoughts, able   to abstract reason, no tangential thoughts, no hallucinations   or delusions  His affect is normal  RESP: No cough, no audible wheezing, able to talk in full sentences  Remainder of exam unable to be completed due to telephone visits            Assessment/Plan:  1. Suspected Covid-19 Virus Infection  discussed with patient (or patient's parents/caregiver) pathophysiology of condition and treatment options.  appropraite for antibody testign, will be called to schedule.   - COVID-19 Virus (Coronavirus) Antibody; Future    2. Right-sided low back pain without sciatica, unspecified chronicity  discussed with patient (or patient's parents/caregiver) pathophysiology of condition and treatment options.  patient will try conservative measures, will et me know and next step would be [facility] vs [facility]. Patient verbalized understanding and is agreeable to this plan.       Return in about 3 months (around 8/27/2020) for Routine Visit, or sooner if symptoms persist or worsen.      Phone call duration:  8 minutes    Jethro Villalba M.D.  Internal Medicine-Pediatrics

## 2020-05-27 NOTE — LETTER
My Depression Action Plan  Name: Sang Mtz   Date of Birth 1960  Date: 5/27/2020    My doctor: Jethro Villalba   My clinic: [address]  [address]  [place] [address] [phone]          GREEN    ZONE   Good Control    What it looks like:     Things are going generally well. You have normal ups and downs. You may even feel depressed from time to time, but bad moods usually last less than a day.   What you need to do:  1. Continue to care for yourself (see self care plan)  2. Check your depression survival kit and update it as needed  3. Follow your physician s recommendations including any medication.  4. Do not stop taking medication unless you consult with your physician first.           YELLOW         ZONE Getting Worse    What it looks like:     Depression is starting to interfere with your life.     It may be hard to get out of bed; you may be starting to isolate yourself from others.    Symptoms of depression are starting to last most all day and this has happened for several days.     You may have suicidal thoughts but they are not constant.   What you need to do:     1. Call your care team. Your response to treatment will improve if you keep your care team informed of your progress. Yellow periods are signs an adjustment may need to be made.     2. Continue your self-care.  Just get dressed and ready for the day.  Don't give yourself time to talk yourself out of it.    3. Talk to someone in your support network.    4. Open up your Depression Self-Care Plan/Wellness Kit.           RED    ZONE Medical Alert - Get Help    What it looks like:     Depression is seriously interfering with your life.     You may experience these or other symptoms: You can t get out of bed most days, can t work or engage in other necessary activities, you have trouble taking care of basic hygiene, or basic responsibilities, thoughts of suicide or death  that will not go away, self-injurious behavior.     What you need to do:  1. Call your care team and request a same-day appointment. If they are not available (weekends or after hours) call your local crisis line, emergency room or 911.            Depression Self-Care Plan / Wellness Kit    Self-Care for Depression  Here s the deal. Your body and mind are really not as separate as most people think.  What you do and think affects how you feel and how you feel influences what you do and think. This means if you do things that people who feel good do, it will help you feel better.  Sometimes this is all it takes.  There is also a place for medication and therapy depending on how severe your depression is, so be sure to consult with your medical provider and/ or Behavioral Health Consultant if your symptoms are worsening or not improving.     In order to better manage my stress, I will:    Exercise  Get some form of exercise, every day. This will help reduce pain and release endorphins, the  feel good  chemicals in your brain. This is almost as good as taking antidepressants!  This is not the same as joining a gym and then never going! (they count on that by the way ) It can be as simple as just going for a walk or doing some gardening, anything that will get you moving.      Hygiene   Maintain good hygiene (get out of bed in the morning, make your bed, brush your teeth, take a shower, and get dressed like you were going to work, even if you are unemployed).  If your clothes don't fit try to get ones that do.    Diet  Strive to eat foods that are good for me, drink plenty of water, and avoid excessive sugar, caffeine, alcohol, and other mood-altering substances.  Some foods that are helpful in depression are: complex carbohydrates, B vitamins, flaxseed, fish or fish oil, fresh fruits and vegetables.    Psychotherapy  Agree to participate in Individual Therapy (if recommended).    Medication  If prescribed medications,  I agree to take them.  Missing doses can result in serious side effects.  I understand that drinking alcohol, or other illicit drug use, may cause potential side effects.  I will not stop my medication abruptly without first discussing it with my provider.    Staying Connected With Others  Stay in touch with my friends, family members, and my primary care provider/team.    Use your imagination  Be creative.  We all have a creative side; it doesn t matter if it s oil painting, sand castles, or mud pies! This will also kick up the endorphins.    Witness Beauty  (AKA stop and smell the roses) Take a look outside, even in mid-winter. Notice colors, textures. Watch the squirrels and birds.     Service to others  Be of service to others.  There is always someone else in need.  By helping others we can  get out of ourselves  and remember the really important things.  This also provides opportunities for practicing all the other parts of the program.    Humor  Laugh and be silly!  Adjust your TV habits for less news and crime-drama and more comedy.    Control your stress  Try breathing deep, massage therapy, biofeedback, and meditation. Find time to relax each day.     Crisis Text Line  http://www.crisistextline.org    The Crisis Text Line serves anyone, in any type of crisis, providing access to free, 24/7 support and information via the medium people already use and trust:    Here's how it works:  1.  Text 555-895 from anywhere in the [place], anytime, about any type of crisis.  2.  A live, trained Crisis Counselor receives the text and responds quickly.  3.  The volunteer Crisis Counselor will help you move from a 'hot moment to a cool moment'.    My support system    Clinic Contact:  Phone number:    Contact 1:  Phone number:    Contact 2:  Phone number:    Sabianism/:  Phone number:    Therapist:  Phone number:    Local crisis center:    Phone number:    Other community support:  Phone number:

## 2020-06-03 DIAGNOSIS — Z20.822 SUSPECTED COVID-19 VIRUS INFECTION: ICD-10-CM

## 2020-06-03 PROCEDURE — 99000 SPECIMEN HANDLING OFFICE-LAB: CPT | Performed by: INTERNAL MEDICINE

## 2020-06-03 PROCEDURE — 86769 SARS-COV-2 COVID-19 ANTIBODY: CPT | Mod: 90 | Performed by: INTERNAL MEDICINE

## 2020-06-03 PROCEDURE — 36415 COLL VENOUS BLD VENIPUNCTURE: CPT | Performed by: INTERNAL MEDICINE

## 2020-06-03 NOTE — LETTER
June 7, 2020        Sang Mtz  [address]   [place] [address]      COVID-19 Antibody Screen   Date Value Ref Range Status   06/03/2020 Negative  Final     Comment:     No COVID-19 antibodies detected.  Patients within 10 days of symptom onset for   COVID-19 may not produce sufficient levels of detectable antibodies.    Immunocompromised COVID-19 patients may take longer to develop antibodies.         You have tested NEGATIVE for COVID-19 antibodies. This suggests you have not had or been exposed to COVID-19. But it does not mean that for sure.     The test finds antibodies in most people 10 days after they get sick. For some people, it takes longer than 10 days for antibodies to show up. Others may never show antibodies against COVID-19, especially if they have weak immune systems.    If you have COVID-19 symptoms now, please stay home and away from others.     What is antibody testing?    This is a kind of blood test. We take a small sample of your blood, and then test it for something called  antibodies.      Your body makes antibodies to fight infection. If your blood has antibodies for a certain germ, it means you ve been infected with that germ in the past.     Sometimes, antibodies stay in your body for years after you ve had the infection. They can be there even if the germ didn t make you sick. They are a sign that your body fought off the infection.    Will this test find antibodies in everyone who s had COVID-19?    No. The test finds antibodies in most people 10 days after they get sick. For some people, it takes longer than 10 days for antibodies to show up. Others may never show antibodies against COVID-19, especially if they have weak immune systems.    What does it mean if the test finds COVID-19 antibodies?    If we find these antibodies, it suggests:     This person has had the virus.     Their body s immune system fought the virus.     We don t know if this will  help protect someone from getting COVID-19 again. Scientists are still learning about this.    What are the signs of COVID-19?    Signs of COVID-19 can appear from 2 to 14 days (up to 2 weeks) after you re infected. Some people have no symptoms or only mild symptoms. Others get very sick. The most common symptoms are:          Cough    Shortness of breath or trouble breathing  Or at least 2 of these symptoms:    Fever    Chills    Repeated shaking with chills    Muscle pain    Headache    Sore throat    Losing your sense of taste or smell    You may have other symptoms. Please contact your doctor or clinic for any symptoms that worry you.    Where can I get more information?     To learn the [facility] guidelines for staying home, please visit the [place] Department of Health website at https://www.health.[facility].[place]/diseases/coronavirus/basics.html    To learn more about COVID-19 and how to care for yourself at home, please visit the CDC website at https://www.cdc.gov/coronavirus/2019-ncov/about/steps-when-sick.html    For more options for care at [facility], please visit our website at [email or web address]    [facility] ([facility]) COVID-19 Hotline:  [phone]

## 2020-06-05 LAB
COVID-19 SPIKE RBD ABY TITER: NORMAL
COVID-19 SPIKE RBD ABY: NEGATIVE

## 2020-06-10 DIAGNOSIS — F32.0 MAJOR DEPRESSIVE DISORDER, SINGLE EPISODE, MILD (H): ICD-10-CM

## 2020-06-11 RX ORDER — ESCITALOPRAM OXALATE 10 MG/1
TABLET ORAL
Qty: 90 TABLET | Refills: 1 | Status: SHIPPED | OUTPATIENT
Start: 2020-06-11 | End: 2020-11-24

## 2020-06-11 NOTE — TELEPHONE ENCOUNTER
Routing refill request to provider for review/approval because:  Drug interaction warning    Camilla Landrum RN on 6/11/2020 at 8:48 AM

## 2020-06-26 ENCOUNTER — MYC MEDICAL ADVICE (OUTPATIENT)
Dept: PEDIATRICS | Facility: CLINIC | Age: 60
End: 2020-06-26

## 2020-06-26 NOTE — TELEPHONE ENCOUNTER
Please see Duroline message.     Misty Bustos, EMT at 3:04 PM on June 26, 2020  [facility] Health Guide   [phone]

## 2020-10-21 DIAGNOSIS — E78.5 HYPERLIPIDEMIA LDL GOAL <130: ICD-10-CM

## 2020-10-21 DIAGNOSIS — Z12.5 PROSTATE CANCER SCREENING: ICD-10-CM

## 2020-10-21 PROCEDURE — G0103 PSA SCREENING: HCPCS | Performed by: INTERNAL MEDICINE

## 2020-10-21 PROCEDURE — 36415 COLL VENOUS BLD VENIPUNCTURE: CPT | Performed by: INTERNAL MEDICINE

## 2020-10-21 PROCEDURE — 80053 COMPREHEN METABOLIC PANEL: CPT | Performed by: INTERNAL MEDICINE

## 2020-10-21 PROCEDURE — 80061 LIPID PANEL: CPT | Performed by: INTERNAL MEDICINE

## 2020-10-22 LAB
ALBUMIN SERPL-MCNC: 4.1 G/DL (ref 3.4–5)
ALP SERPL-CCNC: 129 U/L (ref 40–150)
ALT SERPL W P-5'-P-CCNC: 56 U/L (ref 0–70)
ANION GAP SERPL CALCULATED.3IONS-SCNC: 6 MMOL/L (ref 3–14)
AST SERPL W P-5'-P-CCNC: 22 U/L (ref 0–45)
BILIRUB SERPL-MCNC: 0.7 MG/DL (ref 0.2–1.3)
BUN SERPL-MCNC: 30 MG/DL (ref 7–30)
CALCIUM SERPL-MCNC: 10 MG/DL (ref 8.5–10.1)
CHLORIDE SERPL-SCNC: 106 MMOL/L (ref 94–109)
CHOLEST SERPL-MCNC: 173 MG/DL
CO2 SERPL-SCNC: 28 MMOL/L (ref 20–32)
CREAT SERPL-MCNC: 0.95 MG/DL (ref 0.66–1.25)
GFR SERPL CREATININE-BSD FRML MDRD: 86 ML/MIN/{1.73_M2}
GLUCOSE SERPL-MCNC: 99 MG/DL (ref 70–99)
HDLC SERPL-MCNC: 32 MG/DL
LDLC SERPL CALC-MCNC: 105 MG/DL
NONHDLC SERPL-MCNC: 141 MG/DL
POTASSIUM SERPL-SCNC: 4.2 MMOL/L (ref 3.4–5.3)
PROT SERPL-MCNC: 7.3 G/DL (ref 6.8–8.8)
PSA SERPL-ACNC: 1.92 UG/L (ref 0–4)
SODIUM SERPL-SCNC: 140 MMOL/L (ref 133–144)
TRIGL SERPL-MCNC: 178 MG/DL

## 2020-10-23 ENCOUNTER — MYC MEDICAL ADVICE (OUTPATIENT)
Dept: PEDIATRICS | Facility: CLINIC | Age: 60
End: 2020-10-23

## 2020-11-17 NOTE — PROGRESS NOTES
MTM ENCOUNTER  SUBJECTIVE/OBJECTIVE:                           Sang Mtz is a 60 year old male coming in for an initial visit. He was referred to me from Dr. Villalba.      Reason for visit: hyperlipidemia.    Allergies/ADRs: Reviewed in chart  Tobacco: He reports that he has never smoked. He has never used smokeless tobacco.  Alcohol: none  Caffeine: 24 oz/day of coffee  Activity:  Tries to exercise every day for about 45 min a day. Been using treadmill for a few weeks. Not the same as walking at the park, but he still does it.    Medication Adherence/Access: no issues reported. Never missing any doses.    Hyperlipidemia/Cardiovascular Prevention: Current therapy includes no current medications.    Patient is on aspirin 81 mg for heart protection. Patient is not experiencing any increased bruising or bleeding.  Ongoing for multiple years that HDL has been low. 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 Wondering if any supplements would be beneficial for him right now.  No family history of high cholesterol or cardiovascular concern. Not interested in extra coronary scans right now, mainly wanted to make sure he was on everything he should be for his age. Wanting to see Dr. Villalba again for a yearly physical, but he is booked up with the [facility] and earliest appointment was March. Wants to make sure he is healthy and doing everything he can. His dad lived until 87 and he would like to at least make it to that as well.  The 10-year ASCVD risk score ([place] WALKER Jr., et al., 2013) is: 11.6%    Values used to calculate the score:      Age: 60 years      Sex: Male      Is Non- : No      Diabetic: No      Tobacco smoker: No      Systolic Blood Pressure: 125 mmHg      Is BP treated: No      HDL Cholesterol: 32 mg/dL      Total Cholesterol: 173 mg/dL  Recent Labs   Lab Test 10/21/20  1025 01/22/19  1028 11/14/14  1010 11/14/14  1010 10/11/13  0956    CHOL 173 164   < > 137 158   HDL 32* 29*   < > 33* 27*   * 102*   < > 79 101   TRIG 178* 164*   < > 126 150   CHOLHDLRATIO  --   --   --  4.2 5.8*    < > = values in this interval not displayed.     Depression/Anxiety:  Current medications include: Escitalopram 10 mg once daily. Pt reports that depression symptoms are controlled and has no concerns with his mood. No side effects. Initially had some fogginess dizziness when he started, but that was 4 years ago, no side effects in about 3.5 years.  PHQ-9 SCORE 10/28/2019 12/4/2019 5/27/2020   PHQ-9 Total Score MyChart 0 - -   PHQ-9 Total Score 0 0 0     ROLAN-7 SCORE 12/12/2018 1/22/2019 12/4/2019   Total Score - - -   Total Score 0 0 2     GERD: Current medications include: Prilosec (omeprazole) 40 mg once daily. The patient does not have a history of GI bleed. Patient feels that current regimen is effective. No concerns with side effects. When he lays down flat he gets reflux. Has adjustable bed. Has talked with Dr. Villalba about whether he should stay on it. Has a history of really bad reflux so they decided to stay on it. Reports he would not be able to tolerate the reflux at all if it came back.    Erectile Dysfunction:  Current medications include: Viagra 100 mg. He is using it as needed. No concerns with side effects.     Supplements: Currently taking multivitamin, vitamin C (immune health). No reported issues at this time.     Today's Vitals: /88   Wt 281 lb 11.2 oz (127.8 kg)   BMI 35.21 kg/m        ASSESSMENT:                            Medication Adherence: No issues identified    Hyperlipidemia/Cardiovascular Prevention: Stable. Patient is not indicated for a statin at this time, but would recommend checking cholesterol annually to monitor if anything changes.  Discussed Omega-3 and how this would increase his HDL, but also increase his LDL. Discussed potential side effects from starting statin may also outweigh the benefit from starting one  right now.  Could consider CT Coronary Calcium Scan in the future to determine if any additional ASCVD risk, cost is $99.  Aspirin indicated per aspiringuide.Givey, Bleeding risk 2.1% NNT 55 and NNH 83 aspirin benefit>harm.    Depression/Anxiety: Stable.    GERD: Current treatment is effective. Chronic PPI use places patient at an increased risk of C. Diff, hypomagnesemia and lower bone mineral density, these risks were reviewed with the patient.     Erectile Dysfunction: Stable.    Supplements: Stable.    PLAN:                            1. Continue current therapy    Future consideration:  Could consider CT Coronary Calcium Scan in the future to determine ASCVD risk.    I spent 19 minutes with this patient today. I offer these suggestions for consideration. A copy of the visit note was provided to the patient's primary care provider.    Will follow up in 6 months.    The patient was given a summary of these recommendations.     Liv Worley PharmD  PGY1 Medication Therapy Management Resident  Voicemail: [phone]    Sang GARCIA Bibi was seen independently by Dr. Worley. I have reviewed and agree with the resident note and plan of care.      Irma Kong PharmD [facility]  Medication Therapy Management Provider  Pager #[phone]

## 2020-11-18 ENCOUNTER — OFFICE VISIT (OUTPATIENT)
Dept: PHARMACY | Facility: CLINIC | Age: 60
End: 2020-11-18

## 2020-11-18 VITALS — SYSTOLIC BLOOD PRESSURE: 125 MMHG | DIASTOLIC BLOOD PRESSURE: 88 MMHG | WEIGHT: 281.7 LBS | BODY MASS INDEX: 35.21 KG/M2

## 2020-11-18 DIAGNOSIS — N52.9 ERECTILE DYSFUNCTION, UNSPECIFIED ERECTILE DYSFUNCTION TYPE: ICD-10-CM

## 2020-11-18 DIAGNOSIS — Z78.9 TAKES DIETARY SUPPLEMENTS: ICD-10-CM

## 2020-11-18 DIAGNOSIS — Z79.82 LONG TERM (CURRENT) USE OF ASPIRIN: ICD-10-CM

## 2020-11-18 DIAGNOSIS — E78.5 HYPERLIPIDEMIA LDL GOAL <130: Primary | ICD-10-CM

## 2020-11-18 DIAGNOSIS — F41.9 ANXIETY: ICD-10-CM

## 2020-11-18 DIAGNOSIS — K21.9 GASTROESOPHAGEAL REFLUX DISEASE WITHOUT ESOPHAGITIS: ICD-10-CM

## 2020-11-18 DIAGNOSIS — F32.0 MAJOR DEPRESSIVE DISORDER, SINGLE EPISODE, MILD (H): ICD-10-CM

## 2020-11-18 PROCEDURE — 99605 MTMS BY PHARM NP 15 MIN: CPT | Performed by: PHARMACIST

## 2020-11-18 PROCEDURE — 99607 MTMS BY PHARM ADDL 15 MIN: CPT | Performed by: PHARMACIST

## 2020-11-18 NOTE — PATIENT INSTRUCTIONS
Recommendations from today's MTM visit:                                                    MTM (medication therapy management) is a service provided by a clinical pharmacist designed to help you get the most of out of your medicines.   Today we reviewed what your medicines are for, how to know if they are working, that your medicines are safe and how to make your medicine regimen as easy as possible.     1. Continue medications    It was great to speak with you today.  I value your experience and would be very thankful for your time with providing feedback on our clinic survey. You may receive a survey via email or text message in the next few days.     Next MTM visit: 6 months    To schedule another MTM appointment, please call the clinic directly or you may call the MTM scheduling line at [phone] or toll-free at [phone].     My Clinical Pharmacist's contact information:                                                      It was a pleasure talking with you today!  Please feel free to contact me with any questions or concerns you have.      Liv Worley, PharmD  PGY1 Medication Therapy Management Resident  Voicemail: [phone]

## 2020-11-23 DIAGNOSIS — F32.0 MAJOR DEPRESSIVE DISORDER, SINGLE EPISODE, MILD (H): ICD-10-CM

## 2020-11-24 RX ORDER — ESCITALOPRAM OXALATE 10 MG/1
TABLET ORAL
Qty: 90 TABLET | Refills: 3 | Status: SHIPPED | OUTPATIENT
Start: 2020-11-24 | End: 2021-06-02

## 2020-11-24 NOTE — TELEPHONE ENCOUNTER
Routing refill request to provider for review/approval because:  Labs not current:  phq  Patient needs to be seen because:  Needs appt q6 months   Georgette Albright RN, BSN

## 2021-05-08 ENCOUNTER — HEALTH MAINTENANCE LETTER (OUTPATIENT)
Age: 61
End: 2021-05-08

## 2021-06-01 SDOH — ECONOMIC STABILITY: INCOME INSECURITY: IN THE LAST 12 MONTHS, WAS THERE A TIME WHEN YOU WERE NOT ABLE TO PAY THE MORTGAGE OR RENT ON TIME?: NO

## 2021-06-01 ASSESSMENT — ENCOUNTER SYMPTOMS
MYALGIAS: 0
FREQUENCY: 0
PARESTHESIAS: 0
WEAKNESS: 0
SHORTNESS OF BREATH: 0
NAUSEA: 0
ARTHRALGIAS: 0
CONSTIPATION: 0
DIARRHEA: 0
PALPITATIONS: 0
EYE PAIN: 0
HEMATURIA: 0
SORE THROAT: 0
HEADACHES: 0
HEARTBURN: 0
JOINT SWELLING: 0
HEMATOCHEZIA: 0
ABDOMINAL PAIN: 0
DYSURIA: 0
FEVER: 0
COUGH: 0
CHILLS: 0
DIZZINESS: 0
NERVOUS/ANXIOUS: 0

## 2021-06-02 ENCOUNTER — OFFICE VISIT (OUTPATIENT)
Dept: PEDIATRICS | Facility: CLINIC | Age: 61
End: 2021-06-02
Payer: COMMERCIAL

## 2021-06-02 VITALS
SYSTOLIC BLOOD PRESSURE: 118 MMHG | WEIGHT: 276 LBS | DIASTOLIC BLOOD PRESSURE: 70 MMHG | BODY MASS INDEX: 34.32 KG/M2 | RESPIRATION RATE: 18 BRPM | TEMPERATURE: 97.3 F | HEIGHT: 75 IN | OXYGEN SATURATION: 95 % | HEART RATE: 78 BPM

## 2021-06-02 DIAGNOSIS — E78.5 HYPERLIPIDEMIA LDL GOAL <130: ICD-10-CM

## 2021-06-02 DIAGNOSIS — Z00.00 ROUTINE GENERAL MEDICAL EXAMINATION AT A HEALTH CARE FACILITY: Primary | ICD-10-CM

## 2021-06-02 DIAGNOSIS — Z12.5 PROSTATE CANCER SCREENING: ICD-10-CM

## 2021-06-02 DIAGNOSIS — F32.0 MAJOR DEPRESSIVE DISORDER, SINGLE EPISODE, MILD (H): ICD-10-CM

## 2021-06-02 DIAGNOSIS — Z11.4 SCREENING FOR HIV (HUMAN IMMUNODEFICIENCY VIRUS): ICD-10-CM

## 2021-06-02 DIAGNOSIS — K21.9 GASTROESOPHAGEAL REFLUX DISEASE WITHOUT ESOPHAGITIS: ICD-10-CM

## 2021-06-02 LAB
ALBUMIN SERPL-MCNC: 4 G/DL (ref 3.4–5)
ALP SERPL-CCNC: 127 U/L (ref 40–150)
ALT SERPL W P-5'-P-CCNC: 49 U/L (ref 0–70)
ANION GAP SERPL CALCULATED.3IONS-SCNC: 3 MMOL/L (ref 3–14)
AST SERPL W P-5'-P-CCNC: 16 U/L (ref 0–45)
BILIRUB SERPL-MCNC: 0.7 MG/DL (ref 0.2–1.3)
BUN SERPL-MCNC: 26 MG/DL (ref 7–30)
CALCIUM SERPL-MCNC: 9.8 MG/DL (ref 8.5–10.1)
CHLORIDE SERPL-SCNC: 106 MMOL/L (ref 94–109)
CHOLEST SERPL-MCNC: 180 MG/DL
CO2 SERPL-SCNC: 31 MMOL/L (ref 20–32)
CREAT SERPL-MCNC: 0.84 MG/DL (ref 0.66–1.25)
GFR SERPL CREATININE-BSD FRML MDRD: >90 ML/MIN/{1.73_M2}
GLUCOSE SERPL-MCNC: 98 MG/DL (ref 70–99)
HDLC SERPL-MCNC: 32 MG/DL
LDLC SERPL CALC-MCNC: 116 MG/DL
NONHDLC SERPL-MCNC: 148 MG/DL
POTASSIUM SERPL-SCNC: 4.1 MMOL/L (ref 3.4–5.3)
PROT SERPL-MCNC: 7.1 G/DL (ref 6.8–8.8)
PSA SERPL-ACNC: 1.45 UG/L (ref 0–4)
SODIUM SERPL-SCNC: 140 MMOL/L (ref 133–144)
TRIGL SERPL-MCNC: 160 MG/DL

## 2021-06-02 PROCEDURE — G0103 PSA SCREENING: HCPCS | Performed by: INTERNAL MEDICINE

## 2021-06-02 PROCEDURE — 80061 LIPID PANEL: CPT | Performed by: INTERNAL MEDICINE

## 2021-06-02 PROCEDURE — 80053 COMPREHEN METABOLIC PANEL: CPT | Performed by: INTERNAL MEDICINE

## 2021-06-02 PROCEDURE — 99396 PREV VISIT EST AGE 40-64: CPT | Performed by: INTERNAL MEDICINE

## 2021-06-02 PROCEDURE — 87389 HIV-1 AG W/HIV-1&-2 AB AG IA: CPT | Performed by: INTERNAL MEDICINE

## 2021-06-02 PROCEDURE — 36415 COLL VENOUS BLD VENIPUNCTURE: CPT | Performed by: INTERNAL MEDICINE

## 2021-06-02 RX ORDER — ESCITALOPRAM OXALATE 10 MG/1
10 TABLET ORAL DAILY
Qty: 90 TABLET | Refills: 3 | Status: SHIPPED | OUTPATIENT
Start: 2021-06-02 | End: 2021-10-14

## 2021-06-02 RX ORDER — OMEPRAZOLE 40 MG/1
40 CAPSULE, DELAYED RELEASE ORAL DAILY
Qty: 90 CAPSULE | Refills: 3 | Status: SHIPPED | OUTPATIENT
Start: 2021-06-02 | End: 2021-10-14

## 2021-06-02 ASSESSMENT — ENCOUNTER SYMPTOMS
HEADACHES: 0
FREQUENCY: 0
NAUSEA: 0
PARESTHESIAS: 0
DIZZINESS: 0
JOINT SWELLING: 0
CHILLS: 0
PALPITATIONS: 0
SHORTNESS OF BREATH: 0
SORE THROAT: 0
EYE PAIN: 0
MYALGIAS: 0
HEARTBURN: 0
DIARRHEA: 0
NERVOUS/ANXIOUS: 0
ABDOMINAL PAIN: 0
WEAKNESS: 0
FEVER: 0
COUGH: 0
HEMATURIA: 0
DYSURIA: 0
CONSTIPATION: 0
HEMATOCHEZIA: 0
ARTHRALGIAS: 0

## 2021-06-02 ASSESSMENT — MIFFLIN-ST. JEOR: SCORE: 2137.56

## 2021-06-02 NOTE — PROGRESS NOTES
SUBJECTIVE:   CC: Sang Mtz is an 60 year old male who presents for preventative health visit.       Patient has been advised of split billing requirements and indicates understanding: Yes  Healthy Habits:     Getting at least 3 servings of Calcium per day:  Yes    Bi-annual eye exam:  Yes    Dental care twice a year:  Yes    Sleep apnea or symptoms of sleep apnea:  None    Diet:  Regular (no restrictions)    Frequency of exercise:  4-5 days/week    Duration of exercise:  30-45 minutes    Taking medications regularly:  Yes    Medication side effects:  Not applicable    PHQ-2 Total Score: 0    Additional concerns today:  No    Patient here for routine health maintenance exam, feels well. Due for labs and some refills. Mood has been good, no side effects of medications noted. Did have covid vaccines. No other concerns or complaints today.       Today's PHQ-2 Score:   PHQ-2 ( 1999 Pfizer) 6/1/2021   Q1: Little interest or pleasure in doing things 0   Q2: Feeling down, depressed or hopeless 0   PHQ-2 Score 0   Q1: Little interest or pleasure in doing things Not at all   Q2: Feeling down, depressed or hopeless Not at all   PHQ-2 Score 0       Abuse: Current or Past(Physical, Sexual or Emotional)- No  Do you feel safe in your environment? Yes    Have you ever done Advance Care Planning? (For example, a Health Directive, POLST, or a discussion with a medical provider or your loved ones about your wishes): Yes, advance care planning is on file.    Social History     Tobacco Use     Smoking status: Never Smoker     Smokeless tobacco: Never Used   Substance Use Topics     Alcohol use: No     Frequency: Never         Alcohol Use 6/1/2021   Prescreen: >3 drinks/day or >7 drinks/week? Not Applicable   Prescreen: >3 drinks/day or >7 drinks/week? -       Last PSA:   PSA   Date Value Ref Range Status   10/21/2020 1.92 0 - 4 ug/L Final     Comment:     Assay Method:  Chemiluminescence using Siemens Vista analyzer  "      Reviewed orders with patient. Reviewed health maintenance and updated orders accordingly - Yes      Reviewed and updated as needed this visit by clinical staff                 Reviewed and updated as needed this visit by Provider                    Review of Systems   Constitutional: Negative for chills and fever.   HENT: Negative for congestion, ear pain, hearing loss and sore throat.    Eyes: Negative for pain and visual disturbance.   Respiratory: Negative for cough and shortness of breath.    Cardiovascular: Negative for chest pain, palpitations and peripheral edema.   Gastrointestinal: Negative for abdominal pain, constipation, diarrhea, heartburn, hematochezia and nausea.   Genitourinary: Positive for impotence. Negative for discharge, dysuria, frequency, genital sores, hematuria and urgency.   Musculoskeletal: Negative for arthralgias, joint swelling and myalgias.   Skin: Negative for rash.   Neurological: Negative for dizziness, weakness, headaches and paresthesias.   Psychiatric/Behavioral: Negative for mood changes. The patient is not nervous/anxious.      Reviwed above, all are stable/chronic and/or patient doesn't want to address at physical today unless noted in A/P.         OBJECTIVE:   /70 (BP Location: Right arm, Patient Position: Chair, Cuff Size: Adult Regular)   Pulse 78   Temp 97.3  F (36.3  C) (Tympanic)   Resp 18   Ht 1.905 m (6' 3\")   Wt 125.2 kg (276 lb)   SpO2 95%   BMI 34.50 kg/m      Physical Exam  GENERAL: healthy, alert and no distress  EYES: Eyes grossly normal to inspection, PERRL and conjunctivae and sclerae normal  HENT: ear canals and TM's normal, nose and mouth covered with mask due to covid  NECK: no adenopathy, no asymmetry  RESP: lungs clear to auscultation - no rales, rhonchi or wheezes  CV: regular rate and rhythm, normal S1 S2, no S3 or S4, no murmur, click or rub, no peripheral edema and peripheral pulses strong  ABDOMEN: soft, nontender, no " hepatosplenomegaly, no masses and bowel sounds normal  MS: no gross musculoskeletal defects noted, no edema  SKIN: no suspicious lesions or rashes  NEURO: Normal strength and tone, mentation intact and speech normal  PSYCH: mentation appears normal, affect normal/bright    PHQ 10/28/2019 12/4/2019 5/27/2020   PHQ-9 Total Score 0 0 0   Q9: Thoughts of better off dead/self-harm past 2 weeks Not at all Not at all Not at all       ASSESSMENT/PLAN:   1. Routine general medical examination at a health care facility  d/w pt preventative care measures including seat belt use, bike helmet, moderation of EtOH, avoiding tobacco, avoiding excessive sun exposure/sunscreen, wt management or wt loss if BMI > 30, need to screen for lipid disorders, mood disorders, CAD risk factors, etc. Also discussed accident prevention and future RHM schedule.   - REVIEW OF HEALTH MAINTENANCE PROTOCOL ORDERS    2. Major depressive disorder, single episode, mild (H)  Well controlled on current medication(s). Reviewed concept of depression as function of biochemical imbalance of neurotransmitters/rationale for treatment.  Risks and benefits of medication(s) reviewed with patient.  Questions answered.   - escitalopram (LEXAPRO) 10 MG tablet; Take 1 tablet (10 mg) by mouth daily  Dispense: 90 tablet; Refill: 3    3. Gastroesophageal reflux disease without esophagitis  discussed with patient (or patient's parents/caregiver) pathophysiology of condition and treatment options.  Well controlled on current medication(s). Reivwed diet, etc. No breakthrough symptoms.   - omeprazole (PRILOSEC) 40 MG DR capsule; Take 1 capsule (40 mg) by mouth daily 30-60 minutes before a meal.  Dispense: 90 capsule; Refill: 3    4. Hyperlipidemia LDL goal <130  due for labs.  discussed with patient (or patient's parents/caregiver) pathophysiology of condition and treatment options.  reviewed labs, medications, diet ,etc.    - Lipid panel reflex to direct LDL Fasting  -  "**Comprehensive metabolic panel FUTURE anytime    5. Prostate cancer screening  - **Prostate spec antigen screen FUTURE anytime    6. Screening for HIV (human immunodeficiency virus)  patient agrees with screening per guidlines   - **HIV Antigen Antibody Combo FUTURE anytime    Patient has been advised of split billing requirements and indicates understanding: Yes  COUNSELING:   Reviewed preventive health counseling, as reflected in patient instructions    Estimated body mass index is 35.21 kg/m  as calculated from the following:    Height as of 1/22/19: 1.905 m (6' 3\").    Weight as of 11/18/20: 127.8 kg (281 lb 11.2 oz).     Weight management plan: Discussed healthy diet and exercise guidelines    He reports that he has never smoked. He has never used smokeless tobacco.      Counseling Resources:  ATP IV Guidelines  Pooled Cohorts Equation Calculator  FRAX Risk Assessment  ICSI Preventive Guidelines  Dietary Guidelines for Americans, 2010  USDA's MyPlate  ASA Prophylaxis  Lung CA Screening    I have discussed with patient the risks, benefits, medications, treatment options and modalities.   I have instructed the patient to call or schedule a follow-up appointment if any problems or failure to improve.       Jethro Villalba MD  [facility] JONATHAN  "

## 2021-06-03 LAB — HIV 1+2 AB+HIV1 P24 AG SERPL QL IA: NONREACTIVE

## 2021-08-15 ENCOUNTER — MYC MEDICAL ADVICE (OUTPATIENT)
Dept: PEDIATRICS | Facility: CLINIC | Age: 61
End: 2021-08-15

## 2021-08-20 ENCOUNTER — TRANSFERRED RECORDS (OUTPATIENT)
Dept: HEALTH INFORMATION MANAGEMENT | Facility: CLINIC | Age: 61
End: 2021-08-20

## 2021-09-07 ENCOUNTER — MYC MEDICAL ADVICE (OUTPATIENT)
Dept: PEDIATRICS | Facility: CLINIC | Age: 61
End: 2021-09-07

## 2021-09-28 ENCOUNTER — MYC MEDICAL ADVICE (OUTPATIENT)
Dept: PEDIATRICS | Facility: CLINIC | Age: 61
End: 2021-09-28

## 2021-10-03 DIAGNOSIS — K21.9 GASTROESOPHAGEAL REFLUX DISEASE WITHOUT ESOPHAGITIS: ICD-10-CM

## 2021-10-05 RX ORDER — OMEPRAZOLE 40 MG/1
CAPSULE, DELAYED RELEASE ORAL
Qty: 90 CAPSULE | Refills: 3 | OUTPATIENT
Start: 2021-10-05

## 2021-10-05 NOTE — TELEPHONE ENCOUNTER
Patient given a year supply of medication on 6/2/21. Refusing refill request and closing encounter.     Camilla Landrum RN on 10/5/2021 at 11:45 AM

## 2021-10-18 ENCOUNTER — MYC MEDICAL ADVICE (OUTPATIENT)
Dept: PEDIATRICS | Facility: CLINIC | Age: 61
End: 2021-10-18

## 2021-10-23 ENCOUNTER — HEALTH MAINTENANCE LETTER (OUTPATIENT)
Age: 61
End: 2021-10-23

## 2021-11-30 DIAGNOSIS — F32.0 MAJOR DEPRESSIVE DISORDER, SINGLE EPISODE, MILD (H): ICD-10-CM

## 2021-12-01 RX ORDER — ESCITALOPRAM OXALATE 10 MG/1
TABLET ORAL
Qty: 90 TABLET | Refills: 3 | OUTPATIENT
Start: 2021-12-01

## 2021-12-16 DIAGNOSIS — F32.0 MAJOR DEPRESSIVE DISORDER, SINGLE EPISODE, MILD (H): ICD-10-CM

## 2021-12-20 RX ORDER — ESCITALOPRAM OXALATE 10 MG/1
TABLET ORAL
Qty: 90 TABLET | Refills: 3 | OUTPATIENT
Start: 2021-12-20

## 2022-06-20 SDOH — ECONOMIC STABILITY: FOOD INSECURITY: WITHIN THE PAST 12 MONTHS, YOU WORRIED THAT YOUR FOOD WOULD RUN OUT BEFORE YOU GOT MONEY TO BUY MORE.: NEVER TRUE

## 2022-06-20 SDOH — ECONOMIC STABILITY: INCOME INSECURITY: IN THE LAST 12 MONTHS, WAS THERE A TIME WHEN YOU WERE NOT ABLE TO PAY THE MORTGAGE OR RENT ON TIME?: NO

## 2022-06-20 SDOH — HEALTH STABILITY: PHYSICAL HEALTH: ON AVERAGE, HOW MANY MINUTES DO YOU ENGAGE IN EXERCISE AT THIS LEVEL?: 50 MIN

## 2022-06-20 SDOH — ECONOMIC STABILITY: TRANSPORTATION INSECURITY
IN THE PAST 12 MONTHS, HAS THE LACK OF TRANSPORTATION KEPT YOU FROM MEDICAL APPOINTMENTS OR FROM GETTING MEDICATIONS?: NO

## 2022-06-20 SDOH — ECONOMIC STABILITY: TRANSPORTATION INSECURITY
IN THE PAST 12 MONTHS, HAS LACK OF TRANSPORTATION KEPT YOU FROM MEETINGS, WORK, OR FROM GETTING THINGS NEEDED FOR DAILY LIVING?: NO

## 2022-06-20 SDOH — HEALTH STABILITY: PHYSICAL HEALTH: ON AVERAGE, HOW MANY DAYS PER WEEK DO YOU ENGAGE IN MODERATE TO STRENUOUS EXERCISE (LIKE A BRISK WALK)?: 4 DAYS

## 2022-06-20 SDOH — ECONOMIC STABILITY: INCOME INSECURITY: HOW HARD IS IT FOR YOU TO PAY FOR THE VERY BASICS LIKE FOOD, HOUSING, MEDICAL CARE, AND HEATING?: NOT HARD AT ALL

## 2022-06-20 SDOH — ECONOMIC STABILITY: FOOD INSECURITY: WITHIN THE PAST 12 MONTHS, THE FOOD YOU BOUGHT JUST DIDN'T LAST AND YOU DIDN'T HAVE MONEY TO GET MORE.: NEVER TRUE

## 2022-06-20 ASSESSMENT — ENCOUNTER SYMPTOMS
SORE THROAT: 0
DYSURIA: 0
NAUSEA: 0
CHILLS: 0
FEVER: 0
COUGH: 1
JOINT SWELLING: 0
MYALGIAS: 0
HEADACHES: 0
NERVOUS/ANXIOUS: 0
HEARTBURN: 1
WEAKNESS: 0
ABDOMINAL PAIN: 0
ARTHRALGIAS: 0
EYE PAIN: 0
PARESTHESIAS: 0
SHORTNESS OF BREATH: 0
PALPITATIONS: 0
HEMATURIA: 0
DIZZINESS: 0
FREQUENCY: 0
CONSTIPATION: 0
DIARRHEA: 0
HEMATOCHEZIA: 0

## 2022-06-20 ASSESSMENT — SOCIAL DETERMINANTS OF HEALTH (SDOH)
DO YOU BELONG TO ANY CLUBS OR ORGANIZATIONS SUCH AS CHURCH GROUPS UNIONS, FRATERNAL OR ATHLETIC GROUPS, OR SCHOOL GROUPS?: NO
IN A TYPICAL WEEK, HOW MANY TIMES DO YOU TALK ON THE PHONE WITH FAMILY, FRIENDS, OR NEIGHBORS?: TWICE A WEEK
HOW OFTEN DO YOU ATTEND CHURCH OR RELIGIOUS SERVICES?: NEVER
HOW OFTEN DO YOU GET TOGETHER WITH FRIENDS OR RELATIVES?: ONCE A WEEK

## 2022-06-20 ASSESSMENT — LIFESTYLE VARIABLES
AUDIT-C TOTAL SCORE: 0
SKIP TO QUESTIONS 9-10: 1
HOW MANY STANDARD DRINKS CONTAINING ALCOHOL DO YOU HAVE ON A TYPICAL DAY: PATIENT DOES NOT DRINK
HOW OFTEN DO YOU HAVE SIX OR MORE DRINKS ON ONE OCCASION: NEVER
HOW OFTEN DO YOU HAVE A DRINK CONTAINING ALCOHOL: NEVER

## 2022-06-22 ENCOUNTER — OFFICE VISIT (OUTPATIENT)
Dept: PEDIATRICS | Facility: CLINIC | Age: 62
End: 2022-06-22
Payer: COMMERCIAL

## 2022-06-22 VITALS
HEART RATE: 91 BPM | HEIGHT: 76 IN | TEMPERATURE: 96.8 F | WEIGHT: 263.4 LBS | RESPIRATION RATE: 16 BRPM | SYSTOLIC BLOOD PRESSURE: 124 MMHG | DIASTOLIC BLOOD PRESSURE: 80 MMHG | OXYGEN SATURATION: 94 % | BODY MASS INDEX: 32.08 KG/M2

## 2022-06-22 DIAGNOSIS — Z00.00 ROUTINE GENERAL MEDICAL EXAMINATION AT A HEALTH CARE FACILITY: Primary | ICD-10-CM

## 2022-06-22 DIAGNOSIS — E78.5 HYPERLIPIDEMIA LDL GOAL <130: ICD-10-CM

## 2022-06-22 DIAGNOSIS — K21.9 GASTROESOPHAGEAL REFLUX DISEASE WITHOUT ESOPHAGITIS: ICD-10-CM

## 2022-06-22 DIAGNOSIS — Z12.5 PROSTATE CANCER SCREENING: ICD-10-CM

## 2022-06-22 DIAGNOSIS — F32.0 MAJOR DEPRESSIVE DISORDER, SINGLE EPISODE, MILD (H): ICD-10-CM

## 2022-06-22 PROCEDURE — G0103 PSA SCREENING: HCPCS | Performed by: INTERNAL MEDICINE

## 2022-06-22 PROCEDURE — 99396 PREV VISIT EST AGE 40-64: CPT | Performed by: INTERNAL MEDICINE

## 2022-06-22 PROCEDURE — 36415 COLL VENOUS BLD VENIPUNCTURE: CPT | Performed by: INTERNAL MEDICINE

## 2022-06-22 PROCEDURE — 80053 COMPREHEN METABOLIC PANEL: CPT | Performed by: INTERNAL MEDICINE

## 2022-06-22 PROCEDURE — 80061 LIPID PANEL: CPT | Performed by: INTERNAL MEDICINE

## 2022-06-22 RX ORDER — ESCITALOPRAM OXALATE 10 MG/1
10 TABLET ORAL DAILY
Qty: 90 TABLET | Refills: 3 | Status: SHIPPED | OUTPATIENT
Start: 2022-06-22 | End: 2023-08-21

## 2022-06-22 RX ORDER — OMEPRAZOLE 40 MG/1
40 CAPSULE, DELAYED RELEASE ORAL DAILY
Qty: 90 CAPSULE | Refills: 3 | Status: SHIPPED | OUTPATIENT
Start: 2022-06-22 | End: 2023-08-21

## 2022-06-22 ASSESSMENT — ENCOUNTER SYMPTOMS
PALPITATIONS: 0
MYALGIAS: 0
HEADACHES: 0
ARTHRALGIAS: 0
FEVER: 0
SORE THROAT: 0
EYE PAIN: 0
DIZZINESS: 0
PARESTHESIAS: 0
HEARTBURN: 1
DYSURIA: 0
HEMATURIA: 0
WEAKNESS: 0
DIARRHEA: 0
CONSTIPATION: 0
FREQUENCY: 0
NAUSEA: 0
CHILLS: 0
HEMATOCHEZIA: 0
ABDOMINAL PAIN: 0
JOINT SWELLING: 0
SHORTNESS OF BREATH: 0
NERVOUS/ANXIOUS: 0
COUGH: 1

## 2022-06-22 ASSESSMENT — PAIN SCALES - GENERAL: PAINLEVEL: NO PAIN (0)

## 2022-06-22 NOTE — PROGRESS NOTES
SUBJECTIVE:   CC: Sang Mtz is an 61 year old male who presents for preventative health visit.       Patient has been advised of split billing requirements and indicates understanding: Yes  Healthy Habits:     Getting at least 3 servings of Calcium per day:  Yes    Bi-annual eye exam:  Yes    Dental care twice a year:  Yes    Sleep apnea or symptoms of sleep apnea:  None    Diet:  Regular (no restrictions)    Frequency of exercise:  4-5 days/week    Duration of exercise:  30-45 minutes    Taking medications regularly:  Yes    Medication side effects:  Not applicable    PHQ-2 Total Score: 0    Additional concerns today:  Yes  1)Has had 5 colds since Jan, wondering if allergies laying a role, no fevers, mostly URI symptoms with persistent cough. has done many covid tests and all negative, immunizations are utd. Notes since our last visit he has been exercising and tryong to eat well, has lost some weight, no fevers. was traveling to help a school in [facility] over the winter, feels well. going to [place] for a trip this year. Stress level is manageable. taking all medications and no side effects or issues noted. fastign for labs today. No other concerns or complaints today.             Today's PHQ-2 Score:   PHQ-2 ( 1999 Pfizer) 6/20/2022   Q1: Little interest or pleasure in doing things 0   Q2: Feeling down, depressed or hopeless 0   PHQ-2 Score 0   PHQ-2 Total Score (12-17 Years)- Positive if 3 or more points; Administer PHQ-A if positive -   Q1: Little interest or pleasure in doing things Not at all   Q2: Feeling down, depressed or hopeless Not at all   PHQ-2 Score 0       Abuse: Current or Past(Physical, Sexual or Emotional)- No  Do you feel safe in your environment? Yes        Social History     Tobacco Use     Smoking status: Never Smoker     Smokeless tobacco: Never Used   Substance Use Topics     Alcohol use: No     If you drink alcohol do you typically have >3 drinks per day or >7 drinks per week?  No    Alcohol Use 6/22/2022   Prescreen: >3 drinks/day or >7 drinks/week? -   Prescreen: >3 drinks/day or >7 drinks/week? No       Last PSA:   PSA   Date Value Ref Range Status   06/02/2021 1.45 0 - 4 ug/L Final     Comment:     Assay Method:  Chemiluminescence using Siemens Vista analyzer       Reviewed orders with patient. Reviewed health maintenance and updated orders accordingly - Yes  BP Readings from Last 3 Encounters:   06/22/22 124/80   06/02/21 118/70   11/18/20 125/88    Wt Readings from Last 3 Encounters:   06/22/22 119.5 kg (263 lb 6.4 oz)   06/02/21 125.2 kg (276 lb)   11/18/20 127.8 kg (281 lb 11.2 oz)                    Reviewed and updated as needed this visit by clinical staff   Tobacco  Allergies       Soc Hx          Reviewed and updated as needed this visit by Provider                       Review of Systems   Constitutional: Negative for chills and fever.   HENT: Positive for hearing loss. Negative for congestion, ear pain and sore throat.    Eyes: Negative for pain and visual disturbance.   Respiratory: Positive for cough. Negative for shortness of breath.    Cardiovascular: Negative for chest pain, palpitations and peripheral edema.   Gastrointestinal: Positive for heartburn. Negative for abdominal pain, constipation, diarrhea, hematochezia and nausea.   Genitourinary: Positive for impotence. Negative for dysuria, frequency, genital sores, hematuria, penile discharge and urgency.   Musculoskeletal: Negative for arthralgias, joint swelling and myalgias.   Skin: Negative for rash.   Neurological: Negative for dizziness, weakness, headaches and paresthesias.   Psychiatric/Behavioral: Negative for mood changes. The patient is not nervous/anxious.      Reviwed above, all are stable/chronic and/or patient doesn't want to address at physical today unless noted in A/P.        OBJECTIVE:   /80 (BP Location: Right arm, Patient Position: Chair, Cuff Size: Adult Large)   Pulse 91   Temp 96.8  F  "(36  C) (Tympanic)   Resp 16   Ht 1.918 m (6' 3.5\")   Wt 119.5 kg (263 lb 6.4 oz)   SpO2 94%   BMI 32.49 kg/m      Physical Exam  GENERAL: healthy, alert and no distress  EYES: Eyes grossly normal to inspection, PERRL and conjunctivae and sclerae normal  HENT: ear canals and TM's normal, mouth and nose covered with mask due to covid posterior Op shows mild PND, no exudatesor tonsil hypertrophy  NECK: no adenopathy, no asymmetry  RESP: lungs clear to auscultation - no rales, rhonchi or wheezes  CV: regular rate and rhythm, normal S1 S2, no S3 or S4, no murmur, click or rub, no peripheral edema   ABDOMEN: soft, nontender, no hepatosplenomegaly, no masses and bowel sounds normal  MS: no gross musculoskeletal defects noted, no edema  SKIN: no suspicious lesions or rashes  NEURO: Normal strength and tone, mentation intact and speech normal  PSYCH: mentation appears normal, affect normal/bright    PHQ 12/4/2019 5/27/2020 6/20/2022   PHQ-9 Total Score 0 0 0   Q9: Thoughts of better off dead/self-harm past 2 weeks Not at all Not at all Not at all        ASSESSMENT/PLAN:   (Z00.00) Routine general medical examination at a health care facility  (primary encounter diagnosis)  Comment: d/w pt preventative care measures including seat belt use, bike helmet, moderation of EtOH, avoiding tobacco, avoiding excessive sun exposure/sunscreen, wt management or wt loss if BMI > 30, need to screen for lipid disorders, mood disorders, CAD risk factors, etc. Also discussed accident prevention and future RHM schedule.   Plan: Lipid panel reflex to direct LDL Fasting,         Comprehensive metabolic panel (BMP + Alb, Alk         Phos, ALT, AST, Total. Bili, TP)            (F32.0) Major depressive disorder, single episode, mild (H)  Comment: Well controlled on current medication(s). Reviewed concept of depression as function of biochemical imbalance of neurotransmitters/rationale for treatment.  Risks and benefits of medication(s) " "reviewed with patient.  Questions answered.  Patient instructed to call for significant side effects medications or problems   Plan: escitalopram (LEXAPRO) 10 MG tablet,         Comprehensive metabolic panel (BMP + Alb, Alk         Phos, ALT, AST, Total. Bili, TP)            (K21.9) Gastroesophageal reflux disease without esophagitis  Comment: discussed with patient (or patient's parents/caregiver) pathophysiology of condition and treatment options.  Well controlled on current medication(s). has had EGD in the past, reviwed current guidelines. repeat EGD if breakthrough symptoms or worse, plan for Q5 year surveillance, patient will check his records believes he has had once in that timeframe.  Patient verbalized understanding and is agreeable to this plan.   Plan: omeprazole (PRILOSEC) 40 MG DR capsule, Lipid         panel reflex to direct LDL Fasting,         Comprehensive metabolic panel (BMP + Alb, Alk         Phos, ALT, AST, Total. Bili, TP)          (E78.5) Hyperlipidemia LDL goal <130  Comment: discussed with patient (or patient's parents/caregiver) pathophysiology of condition and treatment options.  reviewed labs, medications, diet ,etc.  due for labs and fasting today.   Plan: Lipid panel reflex to direct LDL Fasting,         Comprehensive metabolic panel (BMP + Alb, Alk         Phos, ALT, AST, Total. Bili, TP)            (Z12.5) Prostate cancer screening  Plan: PSA, screen            Patient has been advised of split billing requirements and indicates understanding: Yes    COUNSELING:   Reviewed preventive health counseling, as reflected in patient instructions    Estimated body mass index is 32.49 kg/m  as calculated from the following:    Height as of this encounter: 1.918 m (6' 3.5\").    Weight as of this encounter: 119.5 kg (263 lb 6.4 oz).         He reports that he has never smoked. He has never used smokeless tobacco.      Counseling Resources:  ATP IV Guidelines  Pooled Cohorts Equation " Calculator  FRAX Risk Assessment  ICSI Preventive Guidelines  Dietary Guidelines for Americans, 2010  USDA's MyPlate  ASA Prophylaxis  Lung CA Screening    I have discussed with patient the risks, benefits, medications, treatment options and modalities.   I have instructed the patient to call or schedule a follow-up appointment if any problems or failure to improve.       Jethro Villalba MD  [facility] JONATHAN      Answers for HPI/ROS submitted by the patient on 6/20/2022  If you checked off any problems, how difficult have these problems made it for you to do your work, take care of things at home, or get along with other people?: Not difficult at all  PHQ9 TOTAL SCORE: 0

## 2022-06-23 LAB
ALBUMIN SERPL-MCNC: 4.1 G/DL (ref 3.4–5)
ALP SERPL-CCNC: 111 U/L (ref 40–150)
ALT SERPL W P-5'-P-CCNC: 34 U/L (ref 0–70)
ANION GAP SERPL CALCULATED.3IONS-SCNC: 7 MMOL/L (ref 3–14)
AST SERPL W P-5'-P-CCNC: 21 U/L (ref 0–45)
BILIRUB SERPL-MCNC: 0.6 MG/DL (ref 0.2–1.3)
BUN SERPL-MCNC: 20 MG/DL (ref 7–30)
CALCIUM SERPL-MCNC: 9.1 MG/DL (ref 8.5–10.1)
CHLORIDE BLD-SCNC: 111 MMOL/L (ref 94–109)
CHOLEST SERPL-MCNC: 146 MG/DL
CO2 SERPL-SCNC: 23 MMOL/L (ref 20–32)
CREAT SERPL-MCNC: 0.91 MG/DL (ref 0.66–1.25)
FASTING STATUS PATIENT QL REPORTED: YES
GFR SERPL CREATININE-BSD FRML MDRD: >90 ML/MIN/1.73M2
GLUCOSE BLD-MCNC: 97 MG/DL (ref 70–99)
HDLC SERPL-MCNC: 27 MG/DL
LDLC SERPL CALC-MCNC: 97 MG/DL
NONHDLC SERPL-MCNC: 119 MG/DL
POTASSIUM BLD-SCNC: 4.3 MMOL/L (ref 3.4–5.3)
PROT SERPL-MCNC: 7.7 G/DL (ref 6.8–8.8)
PSA SERPL-MCNC: 1.75 UG/L (ref 0–4)
SODIUM SERPL-SCNC: 141 MMOL/L (ref 133–144)
TRIGL SERPL-MCNC: 110 MG/DL

## 2022-10-10 ENCOUNTER — HEALTH MAINTENANCE LETTER (OUTPATIENT)
Age: 62
End: 2022-10-10

## 2023-02-08 ENCOUNTER — OFFICE VISIT (OUTPATIENT)
Dept: PEDIATRICS | Facility: CLINIC | Age: 63
End: 2023-02-08
Payer: COMMERCIAL

## 2023-02-08 VITALS
BODY MASS INDEX: 34.99 KG/M2 | DIASTOLIC BLOOD PRESSURE: 74 MMHG | HEIGHT: 75 IN | HEART RATE: 96 BPM | WEIGHT: 281.4 LBS | OXYGEN SATURATION: 94 % | SYSTOLIC BLOOD PRESSURE: 118 MMHG | RESPIRATION RATE: 20 BRPM | TEMPERATURE: 98 F

## 2023-02-08 DIAGNOSIS — H18.513 FUCHS' CORNEAL DYSTROPHY OF BOTH EYES: ICD-10-CM

## 2023-02-08 DIAGNOSIS — K21.9 GASTROESOPHAGEAL REFLUX DISEASE WITHOUT ESOPHAGITIS: ICD-10-CM

## 2023-02-08 DIAGNOSIS — H26.9 CATARACT, UNSPECIFIED CATARACT TYPE, UNSPECIFIED LATERALITY: ICD-10-CM

## 2023-02-08 DIAGNOSIS — Z01.818 PREOP GENERAL PHYSICAL EXAM: Primary | ICD-10-CM

## 2023-02-08 PROCEDURE — 99214 OFFICE O/P EST MOD 30 MIN: CPT | Mod: 25 | Performed by: STUDENT IN AN ORGANIZED HEALTH CARE EDUCATION/TRAINING PROGRAM

## 2023-02-08 PROCEDURE — 90715 TDAP VACCINE 7 YRS/> IM: CPT | Performed by: STUDENT IN AN ORGANIZED HEALTH CARE EDUCATION/TRAINING PROGRAM

## 2023-02-08 PROCEDURE — 90471 IMMUNIZATION ADMIN: CPT | Performed by: STUDENT IN AN ORGANIZED HEALTH CARE EDUCATION/TRAINING PROGRAM

## 2023-02-08 RX ORDER — GATIFLOXACIN 5 MG/ML
SOLUTION/ DROPS OPHTHALMIC
COMMUNITY
Start: 2022-12-09 | End: 2023-12-06

## 2023-02-08 RX ORDER — PREDNISOLONE ACETATE 10 MG/ML
SUSPENSION/ DROPS OPHTHALMIC
COMMUNITY
Start: 2022-12-09

## 2023-02-08 RX ORDER — KETOROLAC TROMETHAMINE 5 MG/ML
SOLUTION OPHTHALMIC
COMMUNITY
Start: 2022-12-09 | End: 2023-12-06

## 2023-02-08 RX ORDER — FAMOTIDINE 40 MG/1
40 TABLET, FILM COATED ORAL DAILY
Qty: 90 TABLET | Refills: 3 | Status: SHIPPED | OUTPATIENT
Start: 2023-02-08 | End: 2023-12-06

## 2023-02-08 ASSESSMENT — PAIN SCALES - GENERAL: PAINLEVEL: NO PAIN (0)

## 2023-02-08 NOTE — PATIENT INSTRUCTIONS
Please call your eye clinic and see if you should hold your aspirin before the procedure. Can also ask them about the combined drops that you can pay for so that it's just one dropper per day.     You can take famotidine/pepcid with your omeprazole if your reflux is driving you crazy after the procedure. Can start it up to a week before your procedure.     For informational purposes only. Not to replace the advice of your health care provider. Copyright   2003, 2019 [facility]. All rights reserved. Clinically reviewed by Sandy Scott MD. [employer] [id number] - REV .  Preparing for Your Surgery  Getting started  A nurse will call you to review your health history and instructions. They will give you an arrival time based on your scheduled surgery time. Please be ready to share:  Your doctor's clinic name and phone number  Your medical, surgical, and anesthesia history  A list of allergies and sensitivities  A list of medicines, including herbal treatments and over-the-counter drugs  Whether the patient has a legal guardian (ask how to send us the papers in advance)  Please tell us if you're pregnant--or if there's any chance you might be pregnant. Some surgeries may injure a fetus (unborn baby), so they require a pregnancy test. Surgeries that are safe for a fetus don't always need a test, and you can choose whether to have one.   If you have a child who's having surgery, please ask for a copy of Preparing for Your Child's Surgery.    Preparing for surgery  Within 10 to 30 days of surgery: Have a pre-op exam (sometimes called an H&P, or History and Physical). This can be done at a clinic or pre-operative center.  If you're having a , you may not need this exam. Talk to your care team.  At your pre-op exam, talk to your care team about all medicines you take. If you need to stop any medicines before surgery, ask when to start taking them again.  We do this for your safety. Many medicines can  make you bleed too much during surgery. Some change how well surgery (anesthesia) drugs work.  Call your insurance company to let them know you're having surgery. (If you don't have insurance, call [phone].)  Call your clinic if there's any change in your health. This includes signs of a cold or flu (sore throat, runny nose, cough, rash, fever). It also includes a scrape or scratch near the surgery site.  If you have questions on the day of surgery, call your hospital or surgery center.  Eating and drinking guidelines  For your safety: Unless your surgeon tells you otherwise, follow the guidelines below.  Eat and drink as usual until 8 hours before you arrive for surgery. After that, no food or milk.  Drink clear liquids until 2 hours before you arrive. These are liquids you can see through, like water, Gatorade, and Propel Water. They also include plain black coffee and tea (no cream or milk), candy, and breath mints. You can spit out gum when you arrive.  If you drink alcohol: Stop drinking it the night before surgery.  If your care team tells you to take medicine on the morning of surgery, it's okay to take it with a sip of water.  Preventing infection  Shower or bathe the night before and morning of your surgery. Follow the instructions your clinic gave you. (If no instructions, use regular soap.)  Don't shave or clip hair near your surgery site. We'll remove the hair if needed.  Don't smoke or vape the morning of surgery. You may chew nicotine gum up to 2 hours before surgery. A nicotine patch is okay.  Note: Some surgeries require you to completely quit smoking and nicotine. Check with your surgeon.  Your care team will make every effort to keep you safe from infection. We will:  Clean our hands often with soap and water (or an alcohol-based hand rub).  Clean the skin at your surgery site with a special soap that kills germs.  Give you a special gown to keep you warm. (Cold raises the risk of  infection.)  Wear special hair covers, masks, gowns and gloves during surgery.  Give antibiotic medicine, if prescribed. Not all surgeries need antibiotics.  What to bring on the day of surgery  Photo ID and insurance card  Copy of your health care directive, if you have one  Glasses and hearing aids (bring cases)  You can't wear contacts during surgery  Inhaler and eye drops, if you use them (tell us about these when you arrive)  CPAP machine or breathing device, if you use them  A few personal items, if spending the night  If you have . . .  A pacemaker, ICD (cardiac defibrillator) or other implant: Bring the ID card.  An implanted stimulator: Bring the remote control.  A legal guardian: Bring a copy of the certified (court-stamped) guardianship papers.  Please remove any jewelry, including body piercings. Leave jewelry and other valuables at home.  If you're going home the day of surgery  You must have a responsible adult drive you home. They should stay with you overnight as well.  If you don't have someone to stay with you, and you aren't safe to go home alone, we may keep you overnight. Insurance often won't pay for this.  After surgery  If it's hard to control your pain or you need more pain medicine, please call your surgeon's office.  Questions?   If you have any questions for your care team, list them here: _________________________________________________________________________________________________________________________________________________________________________ ____________________________________ ____________________________________ ____________________________________

## 2023-02-08 NOTE — PROGRESS NOTES
[facility]  [address]  [address]  [place] [address]  Phone: [phone]  Fax: [phone]  Primary Provider: Jethro Villalba  Pre-op Performing Provider: FARZAD ZHOU      PREOPERATIVE EVALUATION:  Today's date: 2/8/2023    Sang Mtz is a 62 year old male who presents for a preoperative evaluation.    Surgical Information:  Surgery/Procedure: cataract surgery  Surgery Location: [place] [facility]  Surgeon:   Surgery Date: 02/28/2023  Time of Surgery: unknown  Where patient plans to recover: At home with family  Fax number for surgical facility: [phone]    Type of Anesthesia Anticipated: local    Assessment & Plan     The proposed surgical procedure is considered LOW risk.    Preop general physical exam  Cataract, unspecified cataract type, right  Fuchs' corneal dystrophy of both eyes  Nervous for procedure but hopeful for a good outcome.   - update TDAP vaccine     Gastroesophageal reflux disease without esophagitis  Ramp up antacid protection in anticipation of having to be supine for extended time after the procedure.   - famotidine (PEPCID) 40 MG tablet; Take 1 tablet (40 mg) by mouth daily        Risks and Recommendations:  The patient has the following additional risks and recommendations for perioperative complications:   - No identified additional risk factors other than previously addressed    Medication Instructions:  Patient is to take all scheduled medications on the day of surgery but recommended he call to make sure he doesn't need to hold asa.     RECOMMENDATION:  APPROVAL GIVEN to proceed with proposed procedure, without further diagnostic evaluation.      0956}    Subjective     HPI related to upcoming procedure: Has Fuch's dystrophy and cataracts. Watched the procedure on youtube to prepare himself for it. He's pretty nervous about the drops and the procedure but hope it improves quality of life. R eye first.     Pretty bad  reflux, worried about having to lay flat after the procedure, wondering if he should double up on omeprazole.         Preop Questions 2/1/2023   1. Have you ever had a heart attack or stroke? No   2. Have you ever had surgery on your heart or blood vessels, such as a stent placement, a coronary artery bypass, or surgery on an artery in your head, neck, heart, or legs? No   3. Do you have chest pain with activity? No   4. Do you have a history of  heart failure? No   5. Do you currently have a cold, bronchitis or symptoms of other infection? No   6. Do you have a cough, shortness of breath, or wheezing? No   7. Do you or anyone in your family have previous history of blood clots? No   8. Do you or does anyone in your family have a serious bleeding problem such as prolonged bleeding following surgeries or cuts? No   9. Have you ever had problems with anemia or been told to take iron pills? No   10. Have you had any abnormal blood loss such as black, tarry or bloody stools? No   11. Have you ever had a blood transfusion? No   12. Are you willing to have a blood transfusion if it is medically needed before, during, or after your surgery? Yes   13. Have you or any of your relatives ever had problems with anesthesia? No   14. Do you have sleep apnea, excessive snoring or daytime drowsiness? No   15. Do you have any artifical heart valves or other implanted medical devices like a pacemaker, defibrillator, or continuous glucose monitor? No   16. Do you have artificial joints? No   17. Are you allergic to latex? No       Health Care Directive:  Patient does not have a Health Care Directive or Living Will: Patient states has Advance Directive and will bring in a copy to clinic.    Preoperative Review of :   reviewed - no record of controlled substances prescribed.          Review of Systems  CONSTITUTIONAL: NEGATIVE for fever, chills, change in weight  ENT/MOUTH: NEGATIVE for ear, mouth and throat problems  RESP:  NEGATIVE for significant cough or SOB  CV: NEGATIVE for chest pain, palpitations or peripheral edema    Patient Active Problem List    Diagnosis Date Noted     Major depressive disorder, single episode, mild (H) 01/31/2017     Priority: Medium     ACP (advance care planning) 12/15/2015     Priority: Medium     Advance Care Planning 12/15/2015: ACP Review of Chart / Resources Provided:  Reviewed chart for advance care plan.  Sang Mtz has no plan or code status on file. Discussed available resources and provided with information. Advance Care Planning invitation sent. Code status updated and sent to provider for review pending further ACP discussions.  Confirmed/documented legally designated decision maker(s). Added by Rachele Junior             Esophageal reflux 11/18/2014     Priority: Medium     Splenomegaly 02/08/2012     Priority: Medium     Anxiety 02/08/2012     Priority: Medium     Kidney stone 02/08/2012     Priority: Medium     Liver nodule 02/08/2012     Priority: Medium     Mass of lung 02/08/2012     Priority: Medium     Erectile dysfunction 09/13/2010     Priority: Medium     Hypertriglyceridemia 09/13/2010     Priority: Medium     HYPERLIPIDEMIA LDL GOAL <130 02/10/2010     Priority: Medium      Past Medical History:   Diagnosis Date     Depressive disorder      Hypertriglyceridemia 9/13/2010     Past Surgical History:   Procedure Laterality Date     COLONOSCOPY  8/3/2012    Procedure: COLONOSCOPY;  COLONOSCOPY;  Surgeon: Chandler Worthy MD;  Location:  GI     COLONOSCOPY N/A 8/4/2017    Procedure: COMBINED COLONOSCOPY, SINGLE OR MULTIPLE BIOPSY/POLYPECTOMY BY BIOPSY;  COLONOSCOPY ;  Surgeon: Jessica Mendoza MD;  Location:  GI     HC REMOVAL OF TONSILS,<13 Y/O       Current Outpatient Medications   Medication Sig Dispense Refill     ASPIRIN 81 MG OR TABS 1 TABLET DAILY       escitalopram (LEXAPRO) 10 MG tablet Take 1 tablet (10 mg) by mouth daily 90 tablet 3     gatifloxacin (ZYMAXID)  "0.5 % ophthalmic solution instill one drop to operative eye four times daily until bottle is empty, starting one day prior to surgery.       ibuprofen (ADVIL,MOTRIN) 400-800 mg tablet Take 400-800 mg by mouth every 6 hours as needed.       ketorolac (ACULAR) 0.5 % ophthalmic solution instill one drop to operative eye four times daily for 1 week, then two times daily for 7 weeks, starting one day prior to surgery.       MULTI-VITAMIN OR TABS        omeprazole (PRILOSEC) 40 MG DR capsule Take 1 capsule (40 mg) by mouth daily 30-60 minutes before a meal. 90 capsule 3     prednisoLONE acetate (PRED FORTE) 1 % ophthalmic suspension instill one drop to operative eye four times daily until instructed otherwise, starting one day prior to surgery.       sildenafil (VIAGRA) 100 MG tablet Take 0.5-1 tablets by mouth daily as needed for erectile dysfunction. 3 tablet 5     vitamin C (ASCORBIC ACID) 500 MG tablet Take 500 mg by mouth 2 times daily         No Known Allergies     Social History     Tobacco Use     Smoking status: Never     Smokeless tobacco: Never   Substance Use Topics     Alcohol use: No     No family history of anesthesia reaction.   History   Drug Use No         Objective     /74   Pulse 96   Temp 98  F (36.7  C) (Oral)   Resp 20   Ht 1.911 m (6' 3.25\")   Wt 127.6 kg (281 lb 6.4 oz)   SpO2 94%   BMI 34.94 kg/m      Physical Exam  GENERAL APPEARANCE: healthy, alert and no distress  HENT: ear canals and TM's normal and nose and mouth without ulcers or lesions  RESP: lungs clear to auscultation - no rales, rhonchi or wheezes  CV: regular rate and rhythm, normal S1 S2, no S3 or S4 and no murmur, click or rub   ABDOMEN: soft, nontender, no HSM or masses and bowel sounds normal  NEURO: Normal strength and tone, sensory exam grossly normal, mentation intact and speech normal    Recent Labs   Lab Test 06/22/22  1054 06/02/21  1039    140   POTASSIUM 4.3 4.1   CR 0.91 0.84        Diagnostics:  No " labs were ordered during this visit.   No EKG required for low risk surgery (cataract, skin procedure, breast biopsy, etc).    Revised Cardiac Risk Index (RCRI):  The patient has the following serious cardiovascular risks for perioperative complications:   - No serious cardiac risks = 0 points     RCRI Interpretation: 0 points: Class I (very low risk - 0.4% complication rate)           Signed Electronically by: Mary Long MD  Copy of this evaluation report is provided to requesting physician.      I have seen this patient and I was present during key portions of the procedure/exam and immediately available to furnish services during the entire visit. I have reviewed the documentation and discussed the clinical decision making with . Her progress note reflects our joint assessment and plan.     Jethro Villalba M.D.  Internal Medicine-Pediatrics

## 2023-02-28 ENCOUNTER — TELEPHONE (OUTPATIENT)
Dept: PEDIATRICS | Facility: CLINIC | Age: 63
End: 2023-02-28
Payer: COMMERCIAL

## 2023-02-28 NOTE — TELEPHONE ENCOUNTER
"Patient's wife calling on behalf of patient. Patient had eye surgery done today and is \"dejan freaking out\", patient inquiring if medication to \"calm him down\" can be prescribed. RN advised we would recommend e-visit submitting through [employer] for a new medication prescription. RN advised patient could also try contacting eye surgery clinic to see if they have other recommendations. Patient's wife verbalized understanding and will submit e-visit.     Doug SANABRIA RN 2/28/2023 at 3:16 PM    "

## 2023-03-27 ENCOUNTER — TRANSFERRED RECORDS (OUTPATIENT)
Dept: HEALTH INFORMATION MANAGEMENT | Facility: CLINIC | Age: 63
End: 2023-03-27
Payer: COMMERCIAL

## 2023-07-05 SDOH — HEALTH STABILITY: PHYSICAL HEALTH: ON AVERAGE, HOW MANY DAYS PER WEEK DO YOU ENGAGE IN MODERATE TO STRENUOUS EXERCISE (LIKE A BRISK WALK)?: 5 DAYS

## 2023-07-05 SDOH — ECONOMIC STABILITY: INCOME INSECURITY: IN THE LAST 12 MONTHS, WAS THERE A TIME WHEN YOU WERE NOT ABLE TO PAY THE MORTGAGE OR RENT ON TIME?: NO

## 2023-07-05 SDOH — ECONOMIC STABILITY: INCOME INSECURITY: HOW HARD IS IT FOR YOU TO PAY FOR THE VERY BASICS LIKE FOOD, HOUSING, MEDICAL CARE, AND HEATING?: NOT HARD AT ALL

## 2023-07-05 SDOH — HEALTH STABILITY: PHYSICAL HEALTH: ON AVERAGE, HOW MANY MINUTES DO YOU ENGAGE IN EXERCISE AT THIS LEVEL?: 40 MIN

## 2023-07-05 SDOH — ECONOMIC STABILITY: FOOD INSECURITY: WITHIN THE PAST 12 MONTHS, THE FOOD YOU BOUGHT JUST DIDN'T LAST AND YOU DIDN'T HAVE MONEY TO GET MORE.: NEVER TRUE

## 2023-07-05 SDOH — ECONOMIC STABILITY: FOOD INSECURITY: WITHIN THE PAST 12 MONTHS, YOU WORRIED THAT YOUR FOOD WOULD RUN OUT BEFORE YOU GOT MONEY TO BUY MORE.: NEVER TRUE

## 2023-07-05 ASSESSMENT — SOCIAL DETERMINANTS OF HEALTH (SDOH)
HOW OFTEN DO YOU GET TOGETHER WITH FRIENDS OR RELATIVES?: ONCE A WEEK
DO YOU BELONG TO ANY CLUBS OR ORGANIZATIONS SUCH AS CHURCH GROUPS UNIONS, FRATERNAL OR ATHLETIC GROUPS, OR SCHOOL GROUPS?: YES
HOW OFTEN DO YOU ATTEND CHURCH OR RELIGIOUS SERVICES?: NEVER
IN A TYPICAL WEEK, HOW MANY TIMES DO YOU TALK ON THE PHONE WITH FAMILY, FRIENDS, OR NEIGHBORS?: THREE TIMES A WEEK

## 2023-07-05 ASSESSMENT — LIFESTYLE VARIABLES
HOW OFTEN DO YOU HAVE A DRINK CONTAINING ALCOHOL: NEVER
AUDIT-C TOTAL SCORE: 0
SKIP TO QUESTIONS 9-10: 1
HOW MANY STANDARD DRINKS CONTAINING ALCOHOL DO YOU HAVE ON A TYPICAL DAY: PATIENT DOES NOT DRINK
HOW OFTEN DO YOU HAVE SIX OR MORE DRINKS ON ONE OCCASION: NEVER

## 2023-07-12 ENCOUNTER — OFFICE VISIT (OUTPATIENT)
Dept: PEDIATRICS | Facility: CLINIC | Age: 63
End: 2023-07-12
Payer: COMMERCIAL

## 2023-07-12 VITALS
BODY MASS INDEX: 34.25 KG/M2 | HEIGHT: 75 IN | TEMPERATURE: 97.9 F | RESPIRATION RATE: 20 BRPM | SYSTOLIC BLOOD PRESSURE: 116 MMHG | HEART RATE: 80 BPM | OXYGEN SATURATION: 100 % | DIASTOLIC BLOOD PRESSURE: 86 MMHG | WEIGHT: 275.5 LBS

## 2023-07-12 DIAGNOSIS — F32.0 MAJOR DEPRESSIVE DISORDER, SINGLE EPISODE, MILD (H): ICD-10-CM

## 2023-07-12 DIAGNOSIS — K21.9 GASTROESOPHAGEAL REFLUX DISEASE WITHOUT ESOPHAGITIS: ICD-10-CM

## 2023-07-12 DIAGNOSIS — H26.9 CATARACT, UNSPECIFIED CATARACT TYPE, UNSPECIFIED LATERALITY: ICD-10-CM

## 2023-07-12 DIAGNOSIS — H18.513 FUCHS' CORNEAL DYSTROPHY OF BOTH EYES: ICD-10-CM

## 2023-07-12 DIAGNOSIS — Z01.818 PREOP GENERAL PHYSICAL EXAM: Primary | ICD-10-CM

## 2023-07-12 PROCEDURE — 99214 OFFICE O/P EST MOD 30 MIN: CPT | Performed by: INTERNAL MEDICINE

## 2023-07-12 SDOH — ECONOMIC STABILITY: FOOD INSECURITY: WITHIN THE PAST 12 MONTHS, YOU WORRIED THAT YOUR FOOD WOULD RUN OUT BEFORE YOU GOT MONEY TO BUY MORE.: NEVER TRUE

## 2023-07-12 SDOH — HEALTH STABILITY: PHYSICAL HEALTH: ON AVERAGE, HOW MANY MINUTES DO YOU ENGAGE IN EXERCISE AT THIS LEVEL?: 40 MIN

## 2023-07-12 SDOH — ECONOMIC STABILITY: FOOD INSECURITY: WITHIN THE PAST 12 MONTHS, THE FOOD YOU BOUGHT JUST DIDN'T LAST AND YOU DIDN'T HAVE MONEY TO GET MORE.: NEVER TRUE

## 2023-07-12 SDOH — HEALTH STABILITY: PHYSICAL HEALTH: ON AVERAGE, HOW MANY DAYS PER WEEK DO YOU ENGAGE IN MODERATE TO STRENUOUS EXERCISE (LIKE A BRISK WALK)?: 5 DAYS

## 2023-07-12 SDOH — ECONOMIC STABILITY: INCOME INSECURITY: IN THE LAST 12 MONTHS, WAS THERE A TIME WHEN YOU WERE NOT ABLE TO PAY THE MORTGAGE OR RENT ON TIME?: NO

## 2023-07-12 SDOH — ECONOMIC STABILITY: INCOME INSECURITY: HOW HARD IS IT FOR YOU TO PAY FOR THE VERY BASICS LIKE FOOD, HOUSING, MEDICAL CARE, AND HEATING?: NOT HARD AT ALL

## 2023-07-12 ASSESSMENT — ENCOUNTER SYMPTOMS
HEADACHES: 0
HEMATURIA: 0
JOINT SWELLING: 0
COUGH: 0
ARTHRALGIAS: 0
HEMATOCHEZIA: 0
CHILLS: 0
WEAKNESS: 0
FREQUENCY: 0
PALPITATIONS: 0
FEVER: 0
ABDOMINAL PAIN: 0
NAUSEA: 0
SHORTNESS OF BREATH: 0
DIARRHEA: 0
EYE PAIN: 0
NERVOUS/ANXIOUS: 0
MYALGIAS: 0
CONSTIPATION: 0
PARESTHESIAS: 0
HEARTBURN: 0
SORE THROAT: 0
DIZZINESS: 0
DYSURIA: 0

## 2023-07-12 ASSESSMENT — SOCIAL DETERMINANTS OF HEALTH (SDOH)
HOW OFTEN DO YOU ATTEND CHURCH OR RELIGIOUS SERVICES?: NEVER
DO YOU BELONG TO ANY CLUBS OR ORGANIZATIONS SUCH AS CHURCH GROUPS UNIONS, FRATERNAL OR ATHLETIC GROUPS, OR SCHOOL GROUPS?: YES
HOW OFTEN DO YOU GET TOGETHER WITH FRIENDS OR RELATIVES?: ONCE A WEEK
IN A TYPICAL WEEK, HOW MANY TIMES DO YOU TALK ON THE PHONE WITH FAMILY, FRIENDS, OR NEIGHBORS?: THREE TIMES A WEEK

## 2023-07-12 ASSESSMENT — LIFESTYLE VARIABLES
AUDIT-C TOTAL SCORE: 0
SKIP TO QUESTIONS 9-10: 1
HOW OFTEN DO YOU HAVE A DRINK CONTAINING ALCOHOL: NEVER
HOW OFTEN DO YOU HAVE SIX OR MORE DRINKS ON ONE OCCASION: NEVER
HOW MANY STANDARD DRINKS CONTAINING ALCOHOL DO YOU HAVE ON A TYPICAL DAY: PATIENT DOES NOT DRINK

## 2023-07-12 ASSESSMENT — PATIENT HEALTH QUESTIONNAIRE - PHQ9: SUM OF ALL RESPONSES TO PHQ QUESTIONS 1-9: 0

## 2023-07-12 ASSESSMENT — PAIN SCALES - GENERAL: PAINLEVEL: NO PAIN (0)

## 2023-07-12 NOTE — PATIENT INSTRUCTIONS
For informational purposes only. Not to replace the advice of your health care provider. Copyright   2003,  [employer] [facility]. All rights reserved. Clinically reviewed by Sandy Scott MD. [employer] [id number] - REV .  Preparing for Your Surgery  Getting started  A nurse will call you to review your health history and instructions. They will give you an arrival time based on your scheduled surgery time. Please be ready to share:  Your doctor's clinic name and phone number  Your medical, surgical, and anesthesia history  A list of allergies and sensitivities  A list of medicines, including herbal treatments and over-the-counter drugs  Whether the patient has a legal guardian (ask how to send us the papers in advance)  Please tell us if you're pregnant--or if there's any chance you might be pregnant. Some surgeries may injure a fetus (unborn baby), so they require a pregnancy test. Surgeries that are safe for a fetus don't always need a test, and you can choose whether to have one.   If you have a child who's having surgery, please ask for a copy of Preparing for Your Child's Surgery.    Preparing for surgery  Within 10 to 30 days of surgery: Have a pre-op exam (sometimes called an H&P, or History and Physical). This can be done at a clinic or pre-operative center.  If you're having a , you may not need this exam. Talk to your care team.  At your pre-op exam, talk to your care team about all medicines you take. If you need to stop any medicines before surgery, ask when to start taking them again.  We do this for your safety. Many medicines can make you bleed too much during surgery. Some change how well surgery (anesthesia) drugs work.  Call your insurance company to let them know you're having surgery. (If you don't have insurance, call [phone].)  Call your clinic if there's any change in your health. This includes signs of a cold or flu (sore throat, runny nose, cough, rash, fever). It  also includes a scrape or scratch near the surgery site.  If you have questions on the day of surgery, call your hospital or surgery center.  Eating and drinking guidelines  For your safety: Unless your surgeon tells you otherwise, follow the guidelines below.  Eat and drink as usual until 8 hours before you arrive for surgery. After that, no food or milk.  Drink clear liquids until 2 hours before you arrive. These are liquids you can see through, like water, Gatorade, and Propel Water. They also include plain black coffee and tea (no cream or milk), candy, and breath mints. You can spit out gum when you arrive.  If you drink alcohol: Stop drinking it the night before surgery.  If your care team tells you to take medicine on the morning of surgery, it's okay to take it with a sip of water.  Preventing infection  Shower or bathe the night before and morning of your surgery. Follow the instructions your clinic gave you. (If no instructions, use regular soap.)  Don't shave or clip hair near your surgery site. We'll remove the hair if needed.  Don't smoke or vape the morning of surgery. You may chew nicotine gum up to 2 hours before surgery. A nicotine patch is okay.  Note: Some surgeries require you to completely quit smoking and nicotine. Check with your surgeon.  Your care team will make every effort to keep you safe from infection. We will:  Clean our hands often with soap and water (or an alcohol-based hand rub).  Clean the skin at your surgery site with a special soap that kills germs.  Give you a special gown to keep you warm. (Cold raises the risk of infection.)  Wear special hair covers, masks, gowns and gloves during surgery.  Give antibiotic medicine, if prescribed. Not all surgeries need antibiotics.  What to bring on the day of surgery  Photo ID and insurance card  Copy of your health care directive, if you have one  Glasses and hearing aids (bring cases)  You can't wear contacts during surgery  Inhaler and  eye drops, if you use them (tell us about these when you arrive)  CPAP machine or breathing device, if you use them  A few personal items, if spending the night  If you have . . .  A pacemaker, ICD (cardiac defibrillator) or other implant: Bring the ID card.  An implanted stimulator: Bring the remote control.  A legal guardian: Bring a copy of the certified (court-stamped) guardianship papers.  Please remove any jewelry, including body piercings. Leave jewelry and other valuables at home.  If you're going home the day of surgery  You must have a responsible adult drive you home. They should stay with you overnight as well.  If you don't have someone to stay with you, and you aren't safe to go home alone, we may keep you overnight. Insurance often won't pay for this.  After surgery  If it's hard to control your pain or you need more pain medicine, please call your surgeon's office.  Questions?   If you have any questions for your care team, list them here: _________________________________________________________________________________________________________________________________________________________________________ ____________________________________ ____________________________________ ____________________________________    How to Take Your Medication Before Surgery  - Take all of your medications before surgery as usual

## 2023-07-12 NOTE — PROGRESS NOTES
[facility]  [address]  [address]  [place] [address]  Phone: [phone]  Fax: [phone]  Primary Provider: Leonor Rivas  Pre-op Performing Provider: LEONOR RIVAS      PREOPERATIVE EVALUATION:  Today's date: 7/12/2023    Sang Mtz is a 62 year old male who presents for a preoperative evaluation.      7/12/2023    10:42 AM   Additional Questions   Roomed by Shamika Trinidad   Accompanied by N/A         7/12/2023    10:42 AM   Patient Reported Additional Medications   Patient reports taking the following new medications Yes, eyedrops     Surgical Information:  Surgery/Procedure: Cornea transplant/cataract surgery  Sur[place] Location: [place] [facility]  Surgeon:   Surgery Date: 8/1/23  Time of Surgery: Unknown  Where patient plans to recover: At home with family  Fax number for surgical facility:[phone]    Assessment & Plan     The proposed surgical procedure is considered LOW risk.    Preop general physical exam  Cataract, unspecified cataract type, unspecified laterality  Fuchs' corneal dystrophy of both eyes    Major depressive disorder, single episode, mild (H)    Gastroesophageal reflux disease without esophagitis       - No identified additional risk factors other than previously addressed    Antiplatelet or Anticoagulation Medication Instructions:   - Patient is on no antiplatelet or anticoagulation medications.    Additional Medication Instructions:  Patient is to take all scheduled medications on the day of surgery    RECOMMENDATION:  APPROVAL GIVEN to proceed with proposed procedure, without further diagnostic evaluation.        6}    Subjective       HPI related to upcoming procedure: py had a great first surgery and is anxious to get this done, he is so happy he did it. He is hoping to learn where the corneas come form to be able to donate $ as this has had such a positive impact on him.  no concerns or questions today.          7/12/2023    10:34 AM   Preop Questions   1. Have you ever had a heart attack or stroke? No   2. Have you ever had surgery on your heart or blood vessels, such as a stent placement, a coronary artery bypass, or surgery on an artery in your head, neck, heart, or legs? No   3. Do you have chest pain with activity? No   4. Do you have a history of  heart failure? No   5. Do you currently have a cold, bronchitis or symptoms of other infection? No   6. Do you have a cough, shortness of breath, or wheezing? No   7. Do you or anyone in your family have previous history of blood clots? No   8. Do you or does anyone in your family have a serious bleeding problem such as prolonged bleeding following surgeries or cuts? No   9. Have you ever had problems with anemia or been told to take iron pills? No   10. Have you had any abnormal blood loss such as black, tarry or bloody stools? No   11. Have you ever had a blood transfusion? No   12. Are you willing to have a blood transfusion if it is medically needed before, during, or after your surgery? Yes   13. Have you or any of your relatives ever had problems with anesthesia? No   14. Do you have sleep apnea, excessive snoring or daytime drowsiness? No   15. Do you have any artifical heart valves or other implanted medical devices like a pacemaker, defibrillator, or continuous glucose monitor? No   16. Do you have artificial joints? No   17. Are you allergic to latex? No       Health Care Directive:  Patient does not have a Health Care Directive or Living Will:     Preoperative Review of :   reviewed - no record of controlled substances prescribed.    Status of Chronic Conditions:  See problem list for active medical problems.  Problems all longstanding and stable, except as noted/documented.  See ROS for pertinent symptoms related to these conditions.      Review of Systems   Constitutional: Negative for chills and fever.   HENT: Negative for congestion, ear pain, hearing  loss and sore throat.    Eyes: Positive for visual disturbance. Negative for pain.   Respiratory: Negative for cough and shortness of breath.    Cardiovascular: Negative for chest pain, palpitations and peripheral edema.   Gastrointestinal: Negative for abdominal pain, constipation, diarrhea, heartburn, hematochezia and nausea.   Genitourinary: Positive for impotence. Negative for dysuria, frequency, genital sores, hematuria, penile discharge and urgency.   Musculoskeletal: Negative for arthralgias, joint swelling and myalgias.   Skin: Negative for rash.   Neurological: Negative for dizziness, weakness, headaches and paresthesias.   Psychiatric/Behavioral: Negative for mood changes. The patient is not nervous/anxious.      Reviwed above, all are stable/chronic and/or patient doesn't want to address at physical today unless noted in A/P.      Patient Active Problem List    Diagnosis Date Noted     Major depressive disorder, single episode, mild (H) 01/31/2017     Priority: Medium     ACP (advance care planning) 12/15/2015     Priority: Medium     Advance Care Planning 12/15/2015: ACP Review of Chart / Resources Provided:  Reviewed chart for advance care plan.  Sang Mtz has no plan or code status on file. Discussed available resources and provided with information. Advance Care Planning invitation sent. Code status updated and sent to provider for review pending further ACP discussions.  Confirmed/documented legally designated decision maker(s). Added by Rcahele Junior             Esophageal reflux 11/18/2014     Priority: Medium     Splenomegaly 02/08/2012     Priority: Medium     Anxiety 02/08/2012     Priority: Medium     Kidney stone 02/08/2012     Priority: Medium     Liver nodule 02/08/2012     Priority: Medium     Mass of lung 02/08/2012     Priority: Medium     Erectile dysfunction 09/13/2010     Priority: Medium     Hypertriglyceridemia 09/13/2010     Priority: Medium     HYPERLIPIDEMIA LDL GOAL  <130 02/10/2010     Priority: Medium      Past Medical History:   Diagnosis Date     Depressive disorder      Hypertriglyceridemia 9/13/2010     Past Surgical History:   Procedure Laterality Date     COLONOSCOPY  8/3/2012    Procedure: COLONOSCOPY;  COLONOSCOPY;  Surgeon: Chandler Worthy MD;  Location:  GI     COLONOSCOPY N/A 8/4/2017    Procedure: COMBINED COLONOSCOPY, SINGLE OR MULTIPLE BIOPSY/POLYPECTOMY BY BIOPSY;  COLONOSCOPY ;  Surgeon: Jessica Mendoza MD;  Location:  GI     HC REMOVAL OF TONSILS,<9 Y/O       Current Outpatient Medications   Medication Sig Dispense Refill     ASPIRIN 81 MG OR TABS 1 TABLET DAILY       escitalopram (LEXAPRO) 10 MG tablet Take 1 tablet (10 mg) by mouth daily 90 tablet 3     famotidine (PEPCID) 40 MG tablet Take 1 tablet (40 mg) by mouth daily 90 tablet 3     gatifloxacin (ZYMAXID) 0.5 % ophthalmic solution instill one drop to operative eye four times daily until bottle is empty, starting one day prior to surgery.       ibuprofen (ADVIL,MOTRIN) 400-800 mg tablet Take 400-800 mg by mouth every 6 hours as needed.       ketorolac (ACULAR) 0.5 % ophthalmic solution instill one drop to operative eye four times daily for 1 week, then two times daily for 7 weeks, starting one day prior to surgery.       MULTI-VITAMIN OR TABS        omeprazole (PRILOSEC) 40 MG DR capsule Take 1 capsule (40 mg) by mouth daily 30-60 minutes before a meal. 90 capsule 3     prednisoLONE acetate (PRED FORTE) 1 % ophthalmic suspension instill one drop to operative eye four times daily until instructed otherwise, starting one day prior to surgery.       sildenafil (VIAGRA) 100 MG tablet Take 0.5-1 tablets by mouth daily as needed for erectile dysfunction. 3 tablet 5     vitamin C (ASCORBIC ACID) 500 MG tablet Take 500 mg by mouth 2 times daily         No Known Allergies     Social History     Tobacco Use     Smoking status: Never     Smokeless tobacco: Never   Substance Use Topics     Alcohol use: No  "      History   Drug Use No         Objective     /86 (BP Location: Right arm, Patient Position: Sitting, Cuff Size: Adult Large)   Pulse 80   Temp 97.9  F (36.6  C) (Oral)   Resp 20   Ht 1.911 m (6' 3.24\")   Wt 125 kg (275 lb 8 oz)   SpO2 100%   BMI 34.22 kg/m      Physical Exam  GENERAL APPEARANCE: healthy, alert and no distress  HENT: ear canals and TM's normal and nose and mouth without ulcers or lesions  RESP: lungs clear to auscultation - no rales, rhonchi or wheezes  CV: regular rate and rhythm, normal S1 S2, no S3 or S4 and no murmur, click or rub   ABDOMEN: soft, nontender, bowel sounds normal  NEURO: Normal strength and tone, sensory exam grossly normal, mentation intact and speech normal  PSYCH: mentation appears normal and affect normal/bright    Recent Labs   Lab Test 06/22/22  1054      POTASSIUM 4.3   CR 0.91        Diagnostics:  No labs were ordered during this visit.   No EKG required for low risk surgery (cataract, skin procedure, breast biopsy, etc).    Revised Cardiac Risk Index (RCRI):  The patient has the following serious cardiovascular risks for perioperative complications:   - No serious cardiac risks = 0 points     RCRI Interpretation: 0 points: Class I (very low risk - 0.4% complication rate)           Signed Electronically by: Jethro Villalba MD  Copy of this evaluation report is provided to requesting physician.            "

## 2023-08-18 DIAGNOSIS — K21.9 GASTROESOPHAGEAL REFLUX DISEASE WITHOUT ESOPHAGITIS: ICD-10-CM

## 2023-08-21 ENCOUNTER — MYC MEDICAL ADVICE (OUTPATIENT)
Dept: PEDIATRICS | Facility: CLINIC | Age: 63
End: 2023-08-21
Payer: COMMERCIAL

## 2023-08-21 ENCOUNTER — NURSE TRIAGE (OUTPATIENT)
Dept: PEDIATRICS | Facility: CLINIC | Age: 63
End: 2023-08-21
Payer: COMMERCIAL

## 2023-08-21 DIAGNOSIS — F32.0 MAJOR DEPRESSIVE DISORDER, SINGLE EPISODE, MILD (H): ICD-10-CM

## 2023-08-21 RX ORDER — OMEPRAZOLE 40 MG/1
40 CAPSULE, DELAYED RELEASE ORAL DAILY
Qty: 90 CAPSULE | Refills: 1 | Status: SHIPPED | OUTPATIENT
Start: 2023-08-21 | End: 2023-12-13

## 2023-08-21 NOTE — TELEPHONE ENCOUNTER
See nurse triage encounter 8/21/23. Refill request sent to refill pool to process.     Doug SANABRIA RN 8/21/2023 at 11:28 AM

## 2023-08-21 NOTE — TELEPHONE ENCOUNTER
Prescription approved per [facility] Refill Protocol.  Marianne Maria, Registered Nurse  [facility]

## 2023-08-21 NOTE — TELEPHONE ENCOUNTER
"S-(situation): RN called to triage [employer] message regarding diarrhea.     B-(background): Symptoms started a couple weeks ago. Patient had eye surgery on August 1st, taking eye drops, does not list diarrhea as side effect. Patient able to keep fluids in, feels well hydrated.     A-(assessment): Patient experiencing loose and watery diarrhea for past 3 weeks, occurs 1x daily and is urgent. Patient has some right sided abdominal pain off and on, only occurs when twisting to the right or left, not described as painful, feels like a \"muscle twist\", started in last week. No blood in stool. No vomiting.     R-(recommendations): Per protocol, RN advised visit within 3 days. Scheduled for OV 8/22/23 to discuss further with provider. RN advised patient to contact clinic if any new/worsening symptoms, patient agreed with plan.       Reason for Disposition   MILD diarrhea (e.g., 1-3 or more stools than normal in past 24 hours) diarrhea without known cause and present > 7 days    Additional Information   Negative: SEVERE diarrhea (e.g., 7 or more times / day more than normal) and present > 24 hours (1 day)   Negative: MODERATE diarrhea (e.g., 4-6 times / day more than normal) and present > 48 hours (2 days)   Negative: MODERATE diarrhea (e.g., 4-6 times / day more than normal) and age > 70 years   Negative: Abdominal pain (Exception: Pain clears completely with each passage of diarrhea stool.)   Negative: Fever > 101 F (38.3 C)   Negative: Blood in the stool   Negative: Mucus or pus in stool has been present > 2 days and diarrhea is more than mild   Negative: Weak immune system (e.g., HIV positive, cancer chemo, splenectomy, organ transplant, chronic steroids)   Negative: Travel to a foreign country in past month   Negative: Recent antibiotic therapy (i.e., within last 2 months) and diarrhea present > 3 days since antibiotic was stopped   Negative: Recent hospitalization and diarrhea present > 3 days   Negative: Tube feedings " (e.g., nasogastric, g-tube, j-tube)   Negative: SEVERE diarrhea (e.g., 7 or more times / day more than normal) and age > 60 years   Negative: Constant abdominal pain lasting > 2 hours   Negative: Drinking very little and has signs of dehydration (e.g., no urine > 12 hours, very dry mouth, very lightheaded)   Negative: Patient sounds very sick or weak to the triager   Negative: SEVERE abdominal pain (e.g., excruciating) and present > 1 hour   Negative: SEVERE abdominal pain and age > 60 years   Negative: Bloody, black, or tarry bowel movements (Exception: chronic-unchanged black-grey bowel movements and is taking iron pills or Pepto-Bismol)   Negative: Shock suspected (e.g., cold/pale/clammy skin, too weak to stand, low BP, rapid pulse)   Negative: Difficult to awaken or acting confused (e.g., disoriented, slurred speech)   Negative: Sounds like a life-threatening emergency to the triager   Negative: Vomiting also present and worse than the diarrhea   Negative: Blood in stool and without diarrhea    Protocols used: Diarrhea-A-OH      Doug SANABRIA RN 8/21/2023 at 11:27 AM

## 2023-08-22 ENCOUNTER — OFFICE VISIT (OUTPATIENT)
Dept: PEDIATRICS | Facility: CLINIC | Age: 63
End: 2023-08-22
Payer: COMMERCIAL

## 2023-08-22 ENCOUNTER — APPOINTMENT (OUTPATIENT)
Dept: LAB | Facility: CLINIC | Age: 63
End: 2023-08-22
Payer: COMMERCIAL

## 2023-08-22 VITALS
TEMPERATURE: 96.8 F | DIASTOLIC BLOOD PRESSURE: 76 MMHG | SYSTOLIC BLOOD PRESSURE: 104 MMHG | RESPIRATION RATE: 20 BRPM | BODY MASS INDEX: 32.94 KG/M2 | OXYGEN SATURATION: 95 % | HEART RATE: 80 BPM | HEIGHT: 76 IN | WEIGHT: 270.5 LBS

## 2023-08-22 DIAGNOSIS — Z91.89 AT RISK FOR INFECTIOUS DISEASE DUE TO RECENT FOREIGN TRAVEL: ICD-10-CM

## 2023-08-22 DIAGNOSIS — R15.2 FECAL URGENCY: ICD-10-CM

## 2023-08-22 DIAGNOSIS — R19.5 WATERY STOOLS: Primary | ICD-10-CM

## 2023-08-22 DIAGNOSIS — Z23 NEED FOR VACCINATION: ICD-10-CM

## 2023-08-22 LAB

## 2023-08-22 PROCEDURE — 90471 IMMUNIZATION ADMIN: CPT | Performed by: NURSE PRACTITIONER

## 2023-08-22 PROCEDURE — 87507 IADNA-DNA/RNA PROBE TQ 12-25: CPT | Performed by: NURSE PRACTITIONER

## 2023-08-22 PROCEDURE — 99214 OFFICE O/P EST MOD 30 MIN: CPT | Mod: 25 | Performed by: NURSE PRACTITIONER

## 2023-08-22 PROCEDURE — 90677 PCV20 VACCINE IM: CPT | Performed by: NURSE PRACTITIONER

## 2023-08-22 ASSESSMENT — PAIN SCALES - GENERAL: PAINLEVEL: NO PAIN (1)

## 2023-08-22 ASSESSMENT — ENCOUNTER SYMPTOMS: DIARRHEA: 1

## 2023-08-22 NOTE — PROGRESS NOTES
Assessment & Plan   At risk for infectious disease due to recent foreign travel  Watery stools  Fecal urgency  Noted after travel to [place]. Stools abnormal x2 mos at least without improvement.  Even if stool tests negative, may consider treating with abx but hold for now.  Avoid antidiarrheals.  Addendum:tested pos for Norovirus.  - Enteric Bacteria and Virus Panel by [facility] Stool; Future  - C. difficile Toxin B PCR with reflex to C. difficile Antigen and Toxins A/B EIA; Future  - Enteric Bacteria and Virus Panel by [facility] Stool  - C. difficile Toxin B PCR with reflex to C. difficile Antigen and Toxins A/B EIA    Need for vaccination  Update PCV20        Jackelyn Oquendo NP  [facility] JONATHAN Odell is a 63 year old, presenting for the following health issues:  Diarrhea      8/22/2023     7:09 AM   Additional Questions   Roomed by Maia Harrison   Accompanied by N/A         8/22/2023     7:09 AM   Patient Reported Additional Medications   Patient reports taking the following new medications No       Diarrhea    History of Present Illness       Reason for visit:  Diarrhea  Symptom onset:  1-2 weeks ago  Symptoms include:  Frequent bathroomo  Symptom intensity:  Mild  Symptom progression:  Staying the same  Had these symptoms before:  No  What makes it worse:  No  What makes it better:  No    He eats 0-1 servings of fruits and vegetables daily.He consumes 3 sweetened beverage(s) daily.He exercises with enough effort to increase his heart rate 30 to 60 minutes per day.  He exercises with enough effort to increase his heart rate 5 days per week.   He is taking medications regularly.     Mid June went to [place], had some diarrhea end of trip  Doesn't feel like stools returned    No recent abx , drops only.  Was doing probiotic, stopped as he thought It made it worst  No bloody or mucus in stool  Hx of reflux, years of this. Sleeps with head up for years.    Right lower abdomen pain, 1/10 at times  "with movement only    Appetite normal    Weight loss, walks daily      Wt Readings from Last 4 Encounters:   08/22/23 122.7 kg (270 lb 8 oz)   07/12/23 125 kg (275 lb 8 oz)   02/08/23 127.6 kg (281 lb 6.4 oz)   06/22/22 119.5 kg (263 lb 6.4 oz)     No recent hospitalizations  No bloody or mucus in stool        Review of Systems   Gastrointestinal:  Positive for diarrhea.            Objective    /76 (BP Location: Right arm, Patient Position: Sitting, Cuff Size: Adult Large)   Pulse 80   Temp 96.8  F (36  C) (Tympanic)   Resp 20   Ht 1.93 m (6' 4\")   Wt 122.7 kg (270 lb 8 oz)   SpO2 95%   BMI 32.93 kg/m    Body mass index is 32.93 kg/m .  Physical Exam   GENERAL: healthy, alert and no distress  ABDOMEN: soft, nontender, no hepatosplenomegaly, no masses and bowel sounds normal                      "

## 2023-08-23 RX ORDER — ESCITALOPRAM OXALATE 10 MG/1
10 TABLET ORAL DAILY
Qty: 90 TABLET | Refills: 1 | Status: SHIPPED | OUTPATIENT
Start: 2023-08-23 | End: 2023-12-13

## 2023-08-23 RX ORDER — ESCITALOPRAM OXALATE 10 MG/1
10 TABLET ORAL DAILY
Qty: 90 TABLET | Refills: 3 | OUTPATIENT
Start: 2023-08-23

## 2023-09-07 ENCOUNTER — TRANSFERRED RECORDS (OUTPATIENT)
Dept: HEALTH INFORMATION MANAGEMENT | Facility: CLINIC | Age: 63
End: 2023-09-07
Payer: COMMERCIAL

## 2023-09-27 ENCOUNTER — MYC MEDICAL ADVICE (OUTPATIENT)
Dept: PEDIATRICS | Facility: CLINIC | Age: 63
End: 2023-09-27
Payer: COMMERCIAL

## 2023-09-27 ENCOUNTER — E-VISIT (OUTPATIENT)
Dept: PEDIATRICS | Facility: CLINIC | Age: 63
End: 2023-09-27
Payer: COMMERCIAL

## 2023-09-27 DIAGNOSIS — Z71.1 CONCERN ABOUT SKIN DISEASE WITHOUT DIAGNOSIS: Primary | ICD-10-CM

## 2023-09-27 PROCEDURE — 99421 OL DIG E/M SVC 5-10 MIN: CPT | Performed by: INTERNAL MEDICINE

## 2023-09-27 NOTE — PATIENT INSTRUCTIONS
Dear Sang Mtz?     After reviewing your responses, I am unable to make a diagnosis that can be treated online.    You will not be charged for this eVisit.     We are dedicated to helping you achieve your best health and would like to see you in one of our many clinic locations - a primary care provider would be ideal for your concern.    Please use [employer] to schedule a visit with a provider or call [phone]) to schedule at any of our locations.    Thanks for choosing?us?as your health care partner,?   ?   Jethro Villalba MD?

## 2023-09-27 NOTE — TELEPHONE ENCOUNTER
Provider E-Visit time total (minutes): 5 min      Team: please scheule patient with FARHAT vallejo for office visit, he sent dates/times that work for him. thanks!

## 2023-10-28 ENCOUNTER — HEALTH MAINTENANCE LETTER (OUTPATIENT)
Age: 63
End: 2023-10-28

## 2023-10-30 ENCOUNTER — MYC MEDICAL ADVICE (OUTPATIENT)
Dept: PEDIATRICS | Facility: CLINIC | Age: 63
End: 2023-10-30
Payer: COMMERCIAL

## 2023-12-04 ENCOUNTER — MYC MEDICAL ADVICE (OUTPATIENT)
Dept: PEDIATRICS | Facility: CLINIC | Age: 63
End: 2023-12-04
Payer: COMMERCIAL

## 2023-12-04 ENCOUNTER — NURSE TRIAGE (OUTPATIENT)
Dept: PEDIATRICS | Facility: CLINIC | Age: 63
End: 2023-12-04
Payer: COMMERCIAL

## 2023-12-04 NOTE — TELEPHONE ENCOUNTER
S: Rectal problem  B: Noticed a bump near rectal opening 2d ago  A: Raised, area. Non pruruitic, Diarrhea x5 months.    1/10 on 1-10 pain scale    Denies: blood in stool, interfering with daily activities, abdomen pain, fever, vomiting, injury,     R: has appointment in 2 days.  RN protocol recommends appointment within 2 weeks.    Hillary Pemberton RN       Additional Information   Negative: Injury to rectum   Negative: Patient sounds very sick or weak to the triager   Negative: Diarrhea is main symptom   Negative: Constipation is main symptom (e.g., pain or discomfort caused by passage of hard BMs)   Negative: Blood in or on bowel movement is main symptom   Negative: MPOX SUSPECTED (e.g., direct skin contact such as sex, recent travel to [place] or [place]) and any SYMPTOMS OF MPOX (e.g., rash, fever, muscle aches, or swollen lymph nodes)   Negative: At risk for Mpox (men-who-have-sex-with-men) and possible exposure (e.g., multiple sex partners in past 21 days) and ANY SYMPTOMS OF MPOX (e.g., rash, fever, muscle aches, or swollen lymph nodes)   Negative: Sexual assault or rape (sexual intercourse or activity occurs without freely given consent), known or suspected   Negative: Sounds like a life-threatening emergency to the triager   Negative: Severe rectal pain   Negative: Rectal pain or redness and fever > 100.4 F (38.0 C)   Negative: Acute onset rectal pain and constipation (straining with rectal pressure or fullness), which is not relieved by Sitz bath or suppository   Negative: MODERATE-SEVERE rectal pain (i.e., interferes with school, work, or sleep)   Negative: MODERATE-SEVERE rectal itching (i.e., interferes with school, work, or sleep)   Negative: Last bowel movement (BM) > 4 days ago   Negative: Rectal area looks infected (e.g., draining sore, spreading redness)   Negative: Rash of rectal area (e.g., open sore, painful tiny water blisters, unexplained bumps)   Negative: Patient is worried they have a  sexually transmitted infection (STI)    Protocols used: Rectal Symptoms-A-OH

## 2023-12-06 ENCOUNTER — OFFICE VISIT (OUTPATIENT)
Dept: PEDIATRICS | Facility: CLINIC | Age: 63
End: 2023-12-06
Payer: COMMERCIAL

## 2023-12-06 VITALS
WEIGHT: 269.8 LBS | HEART RATE: 88 BPM | BODY MASS INDEX: 32.85 KG/M2 | OXYGEN SATURATION: 97 % | SYSTOLIC BLOOD PRESSURE: 104 MMHG | HEIGHT: 76 IN | DIASTOLIC BLOOD PRESSURE: 82 MMHG | TEMPERATURE: 97.3 F | RESPIRATION RATE: 12 BRPM

## 2023-12-06 DIAGNOSIS — A08.11 NOROVIRUS: ICD-10-CM

## 2023-12-06 DIAGNOSIS — R19.5 WATERY STOOLS: ICD-10-CM

## 2023-12-06 DIAGNOSIS — R15.2 FECAL URGENCY: ICD-10-CM

## 2023-12-06 DIAGNOSIS — K64.4 EXTERNAL HEMORRHOIDS: ICD-10-CM

## 2023-12-06 DIAGNOSIS — K52.9 CHRONIC DIARRHEA: Primary | ICD-10-CM

## 2023-12-06 LAB
ALBUMIN SERPL BCG-MCNC: 4.4 G/DL (ref 3.5–5.2)
ALP SERPL-CCNC: 105 U/L (ref 40–150)
ALT SERPL W P-5'-P-CCNC: 31 U/L (ref 0–70)
ANION GAP SERPL CALCULATED.3IONS-SCNC: 7 MMOL/L (ref 7–15)
AST SERPL W P-5'-P-CCNC: 24 U/L (ref 0–45)
BASOPHILS # BLD AUTO: 0 10E3/UL (ref 0–0.2)
BASOPHILS NFR BLD AUTO: 1 %
BILIRUB SERPL-MCNC: 0.5 MG/DL
BUN SERPL-MCNC: 21.3 MG/DL (ref 8–23)
CALCIUM SERPL-MCNC: 9.7 MG/DL (ref 8.8–10.2)
CHLORIDE SERPL-SCNC: 106 MMOL/L (ref 98–107)
CREAT SERPL-MCNC: 0.9 MG/DL (ref 0.67–1.17)
CRP SERPL-MCNC: 4.17 MG/L
DEPRECATED HCO3 PLAS-SCNC: 29 MMOL/L (ref 22–29)
EGFRCR SERPLBLD CKD-EPI 2021: >90 ML/MIN/1.73M2
EOSINOPHIL # BLD AUTO: 0.2 10E3/UL (ref 0–0.7)
EOSINOPHIL NFR BLD AUTO: 4 %
ERYTHROCYTE [DISTWIDTH] IN BLOOD BY AUTOMATED COUNT: 13.1 % (ref 10–15)
ERYTHROCYTE [SEDIMENTATION RATE] IN BLOOD BY WESTERGREN METHOD: 7 MM/HR (ref 0–20)
GLUCOSE SERPL-MCNC: 102 MG/DL (ref 70–99)
HCT VFR BLD AUTO: 43.6 % (ref 40–53)
HGB BLD-MCNC: 14.9 G/DL (ref 13.3–17.7)
IMM GRANULOCYTES # BLD: 0 10E3/UL
IMM GRANULOCYTES NFR BLD: 1 %
LYMPHOCYTES # BLD AUTO: 1 10E3/UL (ref 0.8–5.3)
LYMPHOCYTES NFR BLD AUTO: 23 %
MCH RBC QN AUTO: 29.7 PG (ref 26.5–33)
MCHC RBC AUTO-ENTMCNC: 34.2 G/DL (ref 31.5–36.5)
MCV RBC AUTO: 87 FL (ref 78–100)
MONOCYTES # BLD AUTO: 0.5 10E3/UL (ref 0–1.3)
MONOCYTES NFR BLD AUTO: 12 %
NEUTROPHILS # BLD AUTO: 2.5 10E3/UL (ref 1.6–8.3)
NEUTROPHILS NFR BLD AUTO: 60 %
PLATELET # BLD AUTO: 196 10E3/UL (ref 150–450)
POTASSIUM SERPL-SCNC: 4.4 MMOL/L (ref 3.4–5.3)
PROT SERPL-MCNC: 7.5 G/DL (ref 6.4–8.3)
RBC # BLD AUTO: 5.01 10E6/UL (ref 4.4–5.9)
SODIUM SERPL-SCNC: 142 MMOL/L (ref 135–145)
TSH SERPL DL<=0.005 MIU/L-ACNC: 0.48 UIU/ML (ref 0.3–4.2)
WBC # BLD AUTO: 4.1 10E3/UL (ref 4–11)

## 2023-12-06 PROCEDURE — 84443 ASSAY THYROID STIM HORMONE: CPT | Performed by: NURSE PRACTITIONER

## 2023-12-06 PROCEDURE — 86140 C-REACTIVE PROTEIN: CPT | Performed by: NURSE PRACTITIONER

## 2023-12-06 PROCEDURE — 99214 OFFICE O/P EST MOD 30 MIN: CPT | Performed by: NURSE PRACTITIONER

## 2023-12-06 PROCEDURE — 80053 COMPREHEN METABOLIC PANEL: CPT | Performed by: NURSE PRACTITIONER

## 2023-12-06 PROCEDURE — 85652 RBC SED RATE AUTOMATED: CPT | Performed by: NURSE PRACTITIONER

## 2023-12-06 PROCEDURE — 36415 COLL VENOUS BLD VENIPUNCTURE: CPT | Performed by: NURSE PRACTITIONER

## 2023-12-06 PROCEDURE — 85025 COMPLETE CBC W/AUTO DIFF WBC: CPT | Performed by: NURSE PRACTITIONER

## 2023-12-06 RX ORDER — CYCLOSPORINE 0.5 MG/ML
1 EMULSION OPHTHALMIC
COMMUNITY
Start: 2023-07-05

## 2023-12-06 ASSESSMENT — PAIN SCALES - GENERAL: PAINLEVEL: NO PAIN (0)

## 2023-12-06 ASSESSMENT — ENCOUNTER SYMPTOMS: DIARRHEA: 1

## 2023-12-06 NOTE — PROGRESS NOTES
Assessment & Plan     Chronic diarrhea  Fecal urgency  Watery stools  Pt tested positive for norovirus on 8/22/23. No improvement of symptoms over the past 3 mos. Of note, he has traveled to [place] x2 and has been on recent abx. Will add on bloodwork and repeat stool studies as well to r/o other causes. Start probiotic.   - TSH with free T4 reflex; Future  - CBC with platelets and differential; Future  - Comprehensive metabolic panel (BMP + Alb, Alk Phos, ALT, AST, Total. Bili, TP); Future  - CRP, inflammation; Future  - ESR: Erythrocyte sedimentation rate; Future  - Ova and Parasite Exam Routine; Future  - C. difficile Toxin B PCR with reflex to C. difficile Antigen and Toxins A/B EIA; Future  - Enteric Bacteria and Virus Panel by [facility] Stool; Future    External hemorrhoids  Exam not done but based on hx sounds most likely hemorrhoid that is likely irritated from frequent stools. Conservative management reviewed.      Patient Instructions   I would recommend starting a probiotic with 10-30 billion colony forming units daily to see if that helps.  Our pharmacy carries some of these.    We will check blood today and add on some extra stool tests    For likely hemorrhoid  -preparation H over-the-counter cream OR tucks pads can be helpful  -soaking is helpful      Jackelyn Oquendo NP  [facility] JONATHAN Odell is a 63 year old, presenting for the following health issues:  Diarrhea (Continues with diarrhea since July), Hypertension (Developed hypertension 10 days ago while having dental surgery done ), and Rectal Problem (Nodual in the rectal area )      12/6/2023    10:19 AM   Additional Questions   Roomed by Jeanne   Accompanied by self         12/6/2023    10:19 AM   Patient Reported Additional Medications   Patient reports taking the following new medications no       Diarrhea    History of Present Illness       Reason for visit:  Ongoing diarrhea since July, 2023He consumes 3 sweetened  "beverage(s) daily.He exercises with enough effort to increase his heart rate 30 to 60 minutes per day.  He exercises with enough effort to increase his heart rate 5 days per week.   He is taking medications regularly.     8/22: pos for norovirus    Wt Readings from Last 4 Encounters:   12/06/23 122.4 kg (269 lb 12.8 oz)   08/22/23 122.7 kg (270 lb 8 oz)   07/12/23 125 kg (275 lb 8 oz)   02/08/23 127.6 kg (281 lb 6.4 oz)     Stools still mostly watery, this has been ongoing at least 3 mos  Did have 4 days of normal soft stools last week but then returned to watery stools  Also having stool urgency  No bloody stools or mucus in stools  Remains smelly  Was on antibiotics for oral surgery last week but didn't worsen his symptoms  Mid June went to [place], had some diarrhea end of trip  Went to [place] again end of October  Imodium helpful  Denies abd pain  Appetite normal  No new meds    Review of Systems   Gastrointestinal:  Positive for diarrhea.            Objective    /82 (BP Location: Right arm, Patient Position: Sitting, Cuff Size: Adult Large)   Pulse 88   Temp 97.3  F (36.3  C) (Tympanic)   Resp 12   Ht 1.918 m (6' 3.5\")   Wt 122.4 kg (269 lb 12.8 oz)   SpO2 97%   BMI 33.28 kg/m    Body mass index is 33.28 kg/m .  Physical Exam   GENERAL: healthy, alert and no distress  PSYCH: mentation appears normal, affect normal/bright                      "

## 2023-12-06 NOTE — PATIENT INSTRUCTIONS
I would recommend starting a probiotic with 10-30 billion colony forming units daily to see if that helps.  Our pharmacy carries some of these.    We will check blood today and add on some extra stool tests    For likely hemorrhoid  -preparation H over-the-counter cream OR tucks pads can be helpful  -soaking is helpful

## 2023-12-07 ENCOUNTER — MYC MEDICAL ADVICE (OUTPATIENT)
Dept: PEDIATRICS | Facility: CLINIC | Age: 63
End: 2023-12-07

## 2023-12-07 DIAGNOSIS — R19.5 WATERY STOOLS: ICD-10-CM

## 2023-12-07 DIAGNOSIS — K52.9 CHRONIC DIARRHEA: Primary | ICD-10-CM

## 2023-12-07 DIAGNOSIS — R15.2 FECAL URGENCY: ICD-10-CM

## 2023-12-07 LAB

## 2023-12-07 PROCEDURE — 87209 SMEAR COMPLEX STAIN: CPT | Performed by: NURSE PRACTITIONER

## 2023-12-07 PROCEDURE — 87507 IADNA-DNA/RNA PROBE TQ 12-25: CPT | Performed by: NURSE PRACTITIONER

## 2023-12-07 PROCEDURE — 87493 C DIFF AMPLIFIED PROBE: CPT | Mod: 59 | Performed by: NURSE PRACTITIONER

## 2023-12-07 PROCEDURE — 87177 OVA AND PARASITES SMEARS: CPT | Performed by: NURSE PRACTITIONER

## 2023-12-08 LAB — O+P STL MICRO: NEGATIVE

## 2023-12-08 ASSESSMENT — ENCOUNTER SYMPTOMS
HEMATOCHEZIA: 0
NAUSEA: 0
CHILLS: 0
HEMATURIA: 0
NERVOUS/ANXIOUS: 0
DIARRHEA: 1
EYE PAIN: 0
ARTHRALGIAS: 0
WEAKNESS: 0
SHORTNESS OF BREATH: 0
FREQUENCY: 0
FEVER: 0
SORE THROAT: 0
DIZZINESS: 0
PARESTHESIAS: 0
PALPITATIONS: 0
JOINT SWELLING: 0
ABDOMINAL PAIN: 0
DYSURIA: 0
CONSTIPATION: 0
HEARTBURN: 0
HEADACHES: 0
MYALGIAS: 0
COUGH: 0

## 2023-12-08 ASSESSMENT — ACTIVITIES OF DAILY LIVING (ADL): CURRENT_FUNCTION: NO ASSISTANCE NEEDED

## 2023-12-13 ENCOUNTER — OFFICE VISIT (OUTPATIENT)
Dept: PEDIATRICS | Facility: CLINIC | Age: 63
End: 2023-12-13
Payer: COMMERCIAL

## 2023-12-13 VITALS
HEART RATE: 84 BPM | BODY MASS INDEX: 33.06 KG/M2 | HEIGHT: 76 IN | TEMPERATURE: 97.8 F | WEIGHT: 271.5 LBS | DIASTOLIC BLOOD PRESSURE: 76 MMHG | OXYGEN SATURATION: 97 % | RESPIRATION RATE: 18 BRPM | SYSTOLIC BLOOD PRESSURE: 120 MMHG

## 2023-12-13 DIAGNOSIS — N52.9 ERECTILE DYSFUNCTION, UNSPECIFIED ERECTILE DYSFUNCTION TYPE: ICD-10-CM

## 2023-12-13 DIAGNOSIS — K21.9 GASTROESOPHAGEAL REFLUX DISEASE WITHOUT ESOPHAGITIS: ICD-10-CM

## 2023-12-13 DIAGNOSIS — Z00.00 MEDICARE ANNUAL WELLNESS VISIT, INITIAL: Primary | ICD-10-CM

## 2023-12-13 DIAGNOSIS — E78.5 HYPERLIPIDEMIA LDL GOAL <130: ICD-10-CM

## 2023-12-13 DIAGNOSIS — Z23 NEED FOR VACCINATION: ICD-10-CM

## 2023-12-13 DIAGNOSIS — Z71.84 ENCOUNTER FOR HEALTH COUNSELING RELATED TO TRAVEL: ICD-10-CM

## 2023-12-13 DIAGNOSIS — F32.0 MAJOR DEPRESSIVE DISORDER, SINGLE EPISODE, MILD (H): ICD-10-CM

## 2023-12-13 DIAGNOSIS — Z12.5 PROSTATE CANCER SCREENING: ICD-10-CM

## 2023-12-13 PROCEDURE — 99397 PER PM REEVAL EST PAT 65+ YR: CPT | Performed by: INTERNAL MEDICINE

## 2023-12-13 PROCEDURE — 99213 OFFICE O/P EST LOW 20 MIN: CPT | Mod: 25 | Performed by: INTERNAL MEDICINE

## 2023-12-13 RX ORDER — ESCITALOPRAM OXALATE 10 MG/1
10 TABLET ORAL DAILY
Qty: 90 TABLET | Refills: 3 | Status: SHIPPED | OUTPATIENT
Start: 2023-12-13

## 2023-12-13 RX ORDER — OMEPRAZOLE 40 MG/1
40 CAPSULE, DELAYED RELEASE ORAL DAILY
Qty: 90 CAPSULE | Refills: 3 | Status: SHIPPED | OUTPATIENT
Start: 2023-12-13

## 2023-12-13 RX ORDER — SILDENAFIL 100 MG/1
50-100 TABLET, FILM COATED ORAL DAILY PRN
Qty: 3 TABLET | Refills: 11 | Status: SHIPPED | OUTPATIENT
Start: 2023-12-13

## 2023-12-13 ASSESSMENT — ENCOUNTER SYMPTOMS
CONSTIPATION: 0
COUGH: 0
ARTHRALGIAS: 0
EYE PAIN: 0
WEAKNESS: 0
SORE THROAT: 0
DIZZINESS: 0
HEARTBURN: 0
JOINT SWELLING: 0
NERVOUS/ANXIOUS: 0
CHILLS: 0
HEMATURIA: 0
PALPITATIONS: 0
PARESTHESIAS: 0
DIARRHEA: 1
ABDOMINAL PAIN: 0
NAUSEA: 0
HEADACHES: 0
FREQUENCY: 0
HEMATOCHEZIA: 0
SHORTNESS OF BREATH: 0
MYALGIAS: 0
FEVER: 0
DYSURIA: 0

## 2023-12-13 ASSESSMENT — PATIENT HEALTH QUESTIONNAIRE - PHQ9
SUM OF ALL RESPONSES TO PHQ QUESTIONS 1-9: 0
10. IF YOU CHECKED OFF ANY PROBLEMS, HOW DIFFICULT HAVE THESE PROBLEMS MADE IT FOR YOU TO DO YOUR WORK, TAKE CARE OF THINGS AT HOME, OR GET ALONG WITH OTHER PEOPLE: NOT DIFFICULT AT ALL
SUM OF ALL RESPONSES TO PHQ QUESTIONS 1-9: 0

## 2023-12-13 ASSESSMENT — PAIN SCALES - GENERAL: PAINLEVEL: NO PAIN (0)

## 2023-12-13 ASSESSMENT — ACTIVITIES OF DAILY LIVING (ADL): CURRENT_FUNCTION: NO ASSISTANCE NEEDED

## 2023-12-13 NOTE — PROGRESS NOTES
"SUBJECTIVE:   Jose R is a 63 year old, presenting for the following:  Annual Visit        12/13/2023     2:44 PM   Additional Questions   Roomed by Sharon Pemberton CMA       Are you in the first 12 months of your Medicare coverage?  Yes,  Visual Acuity:  Right Eye: 20/20   Left Eye: 20/20  Both Eyes: 20/20 - no glasses    Healthy Habits:     In general, how would you rate your overall health?  Good    Frequency of exercise:  4-5 days/week    Duration of exercise:  30-45 minutes    Do you usually eat at least 4 servings of fruit and vegetables a day, include whole grains    & fiber and avoid regularly eating high fat or \"junk\" foods?  No    Taking medications regularly:  Yes    Medication side effects:  Not applicable    Ability to successfully perform activities of daily living:  No assistance needed    Home Safety:  No safety concerns identified    Hearing Impairment:  Need to ask people to speak up or repeat themselves    In the past 6 months, have you been bothered by leaking of urine?  No    In general, how would you rate your overall mental or emotional health?  Excellent    Additional concerns today:  Yes    Patient presents today for his annual visit.  Overall he is doing quite well.  He is very happy to report that his diarrhea has resolved.  He is in fact has had normal stools for last 5 days and he thinks he can go ahead and hold on the colonoscopy.  He does have a form with him and would like my partner Azra Oquendo to reach out to his insurance company to see if we can get the stool studies that did show norovirus covered and I reported would be happy to do this.  He also does plan to travel to [place] and will be him to go to for 3 weeks.  We did review the CDC website around travel recommendations and plan to do a hepatitis a and typhoid vaccine today.  No malaria prophylaxis recommended per the CDC website and he otherwise is up-to-date.  He notes his mood is good.  He is taking all medications and " no side effects noted.  Does need some refills today. No other concerns or complaints today.       Today's PHQ-9 Score:       12/13/2023     2:24 PM   PHQ-9 SCORE   PHQ-9 Total Score MyChart 0   PHQ-9 Total Score 0     Have you ever done Advance Care Planning? (For example, a Health Directive, POLST, or a discussion with a medical provider or your loved ones about your wishes): No, advance care planning information given to patient to review.  Patient declined advance care planning discussion at this time.      Fall risk  Fallen 2 or more times in the past year?: No  Any fall with injury in the past year?: No    Cognitive Screening   1) Repeat 3 items (Leader, Season, Table)    2) Clock draw: NORMAL  3) 3 item recall: Recalls 3 objects  Results: 3 items recalled: COGNITIVE IMPAIRMENT LESS LIKELY    Mini-CogTM Copyright S [place]. Licensed by the author for use in [facility]; reprinted with permission (gómez@[facility]). All rights reserved.      Do you have sleep apnea, excessive snoring or daytime drowsiness? : no    Reviewed and updated as needed this visit by clinical staff   Tobacco  Allergies  Meds              Reviewed and updated as needed this visit by Provider                 Social History     Tobacco Use    Smoking status: Never    Smokeless tobacco: Never   Substance Use Topics    Alcohol use: No             12/8/2023     2:12 PM   Alcohol Use   Prescreen: >3 drinks/day or >7 drinks/week? Not Applicable     Do you have a current opioid prescription? No  Do you use any other controlled substances or medications that are not prescribed by a provider? None    Current providers sharing in care for this patient include:   Patient Care Team:  Jethro Villalba MD as PCP - General  Jethro Villalba MD as Assigned PCP    The following health maintenance items are reviewed in Epic and correct as of today:  Health Maintenance   Topic Date Due    RSV VACCINE (Pregnancy & 60+) (1 - 1-dose 60+ series)  Never done    MEDICARE ANNUAL WELLNESS VISIT  07/24/2023    PHQ-9  06/13/2024    ANNUAL REVIEW OF HM ORDERS  07/12/2024    FALL RISK ASSESSMENT  12/13/2024    COLORECTAL CANCER SCREENING  08/20/2026    LIPID  06/22/2027    ADVANCE CARE PLANNING  12/13/2028    DTAP/TDAP/TD IMMUNIZATION (4 - Td or Tdap) 02/08/2033    HEPATITIS C SCREENING  Completed    HIV SCREENING  Completed    DEPRESSION ACTION PLAN  Completed    INFLUENZA VACCINE  Completed    Pneumococcal Vaccine: 65+ Years  Completed    ZOSTER IMMUNIZATION  Completed    AORTIC ANEURYSM SCREENING (SYSTEM ASSIGNED)  Completed    COVID-19 Vaccine  Completed    IPV IMMUNIZATION  Aged Out    HPV IMMUNIZATION  Aged Out    MENINGITIS IMMUNIZATION  Aged Out    RSV MONOCLONAL ANTIBODY  Aged Out     BP Readings from Last 3 Encounters:   12/13/23 120/76   12/06/23 104/82   08/22/23 104/76    Wt Readings from Last 3 Encounters:   12/13/23 123.2 kg (271 lb 8 oz)   12/06/23 122.4 kg (269 lb 12.8 oz)   08/22/23 122.7 kg (270 lb 8 oz)               Review of Systems   Constitutional:  Negative for chills and fever.   HENT:  Positive for hearing loss. Negative for congestion, ear pain and sore throat.    Eyes:  Negative for pain and visual disturbance.   Respiratory:  Negative for cough and shortness of breath.    Cardiovascular:  Negative for chest pain, palpitations and peripheral edema.   Gastrointestinal:  Positive for diarrhea. Negative for abdominal pain, constipation, heartburn, hematochezia and nausea.   Genitourinary:  Negative for dysuria, frequency, genital sores, hematuria, impotence, penile discharge and urgency.   Musculoskeletal:  Negative for arthralgias, joint swelling and myalgias.   Skin:  Negative for rash.   Neurological:  Negative for dizziness, weakness, headaches and paresthesias.   Psychiatric/Behavioral:  Negative for mood changes. The patient is not nervous/anxious.      discussed with patient (or patient's parents/caregiver) pathophysiology of  "condition and treatment options.  reviewed labs, medications, diet ,etc.      OBJECTIVE:   /76 (BP Location: Right arm, Cuff Size: Adult Large)   Pulse 84   Temp 97.8  F (36.6  C) (Tympanic)   Resp 18   Ht 1.918 m (6' 3.5\")   Wt 123.2 kg (271 lb 8 oz)   SpO2 97%   BMI 33.49 kg/m   Estimated body mass index is 33.49 kg/m  as calculated from the following:    Height as of this encounter: 1.918 m (6' 3.5\").    Weight as of this encounter: 123.2 kg (271 lb 8 oz).  Wt Readings from Last 4 Encounters:   12/13/23 123.2 kg (271 lb 8 oz)   12/06/23 122.4 kg (269 lb 12.8 oz)   08/22/23 122.7 kg (270 lb 8 oz)   07/12/23 125 kg (275 lb 8 oz)         Physical Exam  GENERAL: healthy, alert and no distress  EYES: Eyes grossly normal to inspection, PERRL and conjunctivae and sclerae normal  HENT: ear canals and TM's normal, op clear, mucous membranes moist   NECK: no adenopathy, no asymmetry  RESP: lungs clear to auscultation - no rales, rhonchi or wheezes  CV: regular rate and rhythm, normal S1 S2, no S3 or S4, no murmur, click or rub, no peripheral edema   ABDOMEN: soft, nontender, no hepatosplenomegaly, no masses and bowel sounds normal  MS: no gross musculoskeletal defects noted, no edema  SKIN: no suspicious lesions or rashes  NEURO: Normal strength and tone, mentation intact and speech normal  PSYCH: mentation appears normal, affect normal/bright          ASSESSMENT / PLAN:   (Z00.00) Medicare annual wellness visit, initial  (primary encounter diagnosis)  Comment: d/w pt preventative care measures including seat belt use, bike helmet, moderation of EtOH, avoiding tobacco, avoiding excessive sun exposure/sunscreen, wt management or wt loss if BMI > 30, need to screen for lipid disorders, mood disorders, CAD risk factors, etc. Also discussed accident prevention and future RHM schedule.   Plan: Lipid panel reflex to direct LDL Fasting,         Comprehensive metabolic panel (BMP + Alb, Alk         Phos, ALT, AST, " Total. Bili, TP), PSA, screen            (Z71.84) Encounter for health counseling related to travel  Comment: Reviewed CDC immunization and medication guidelines for destination(s). Also discussed antibiotics for diarrhea/GI issues, wash hands frequently, vaccines, sun/cold avoidance, mosquito avoidance, etc. Routine vaccines reviewed as well and updated as needed unless refused by patient.    Plan: typhoid (VIVOTIF) CR capsule            (F32.0) Major depressive disorder, single episode, mild (H24)  Comment: discussed with patient (or patient's parents/caregiver) pathophysiology of condition and treatment options.  Well controlled on current medication(s).  Plan: escitalopram (LEXAPRO) 10 MG tablet            (K21.9) Gastroesophageal reflux disease without esophagitis  Comment: discussed with patient (or patient's parents/caregiver) pathophysiology of condition and treatment options.  Well controlled on current medication(s). if on chronicall y plan to get EGD with next colonoscopy. Patient verbalized understanding and is agreeable to this plan.   Plan: omeprazole (PRILOSEC) 40 MG DR capsule            (N52.9) Erectile dysfunction, unspecified erectile dysfunction type  Comment: discussed with patient (or patient's parents/caregiver) pathophysiology of condition and treatment options.  Well controlled on current medication(s).   Plan: sildenafil (VIAGRA) 100 MG tablet            (Z23) Need for vaccination  Plan: typhoid (VIVOTIF) CR capsule            (E78.5) Hyperlipidemia LDL goal <130  Comment: due for labs. reviwed diet, etc.   Plan: Lipid panel reflex to direct LDL Fasting,         Comprehensive metabolic panel (BMP + Alb, Alk         Phos, ALT, AST, Total. Bili, TP)            (Z12.5) Prostate cancer screening  Plan: PSA, screen            Patient has been advised of split billing requirements and indicates understanding: Yes      COUNSELING:  Reviewed preventive health counseling, as reflected in patient  "instructions      BMI:   Estimated body mass index is 33.49 kg/m  as calculated from the following:    Height as of this encounter: 1.918 m (6' 3.5\").    Weight as of this encounter: 123.2 kg (271 lb 8 oz).   Weight management plan: Discussed healthy diet and exercise guidelines      He reports that he has never smoked. He has never used smokeless tobacco.      Appropriate preventive services were discussed with this patient, including applicable screening as appropriate for fall prevention, nutrition, physical activity, Tobacco-use cessation, weight loss and cognition.  Checklist reviewing preventive services available has been given to the patient.    Reviewed patients plan of care and provided an AVS. The Basic Care Plan (routine screening as documented in Health Maintenance) for Sang meets the Care Plan requirement. This Care Plan has been established and reviewed with the Patient.    I have discussed with patient the risks, benefits, medications, treatment options and modalities.   I have instructed the patient to call or schedule a follow-up appointment if any problems or failure to improve.       Return in about 1 year (around 12/13/2024) for Routine Visit, or sooner if symptoms persist or worsen.    Jethro Villalba MD  [facility]    (*documentation in this chart was partially completed using Dragon PowerMic which could potentially lead to some misspellings or grammar/autocorrect issues in the narrative.)      Answers submitted by the patient for this visit:  Patient Health Questionnaire (Submitted on 12/13/2023)  If you checked off any problems, how difficult have these problems made it for you to do your work, take care of things at home, or get along with other people?: Not difficult at all  PHQ9 TOTAL SCORE: 0    "

## 2023-12-13 NOTE — PATIENT INSTRUCTIONS
Preventive Health Recommendations:     See your health care provider every year to  Review health changes.   Discuss preventive care.    Review your medicines if your doctor has prescribed any.    Talk with your health care provider about whether you should have a test to screen for prostate cancer (PSA).  Every 3 years, have a diabetes test (fasting glucose). If you are at risk for diabetes, you should have this test more often.  Every 5 years, have a cholesterol test. Have this test more often if you are at risk for high cholesterol or heart disease.   Every 10 years, have a colonoscopy. Or, have a yearly FIT test (stool test). These exams will check for colon cancer.  Talk to with your health care provider about screening for Abdominal Aortic Aneurysm if you have a family history of AAA or have a history of smoking.    Shots:   Get a flu shot each year.   Get a tetanus shot every 10 years.   Talk to your doctor about your pneumonia vaccines. There are now two you should receive - Pneumovax (PPSV 23) and Prevnar (PCV 13).   Talk to your pharmacist about a shingles vaccine.   Talk to your doctor about the hepatitis B vaccine.  Nutrition:   Eat at least 5 servings of fruits and vegetables each day.   Eat whole-grain bread, whole-wheat pasta and brown rice instead of white grains and rice.   Get adequate Calcium and Vitamin D.   Lifestyle  Exercise for at least 150 minutes a week (30 minutes a day, 5 days a week). This will help you control your weight and prevent disease.   Limit alcohol to one drink per day.   No smoking.   Wear sunscreen to prevent skin cancer.  See your dentist every six months for an exam and cleaning.  See your eye doctor every 1 to 2 years to screen for conditions such as glaucoma, macular degeneration, cataracts, etc.    Personalized Prevention Plan  You are due for the preventive services outlined below.  Your care team is available to assist you in scheduling these services.  If you have  already completed any of these items, please share that information with your care team to update in your medical record.  Health Maintenance Due   Topic Date Due     RSV VACCINE (Pregnancy & 60+) (1 - 1-dose 60+ series) Never done     Annual Wellness Visit  07/24/2023

## 2023-12-14 ENCOUNTER — TELEPHONE (OUTPATIENT)
Dept: PEDIATRICS | Facility: CLINIC | Age: 63
End: 2023-12-14
Payer: COMMERCIAL

## 2023-12-14 NOTE — LETTER
December 14, 2023      Sang Mtz  [address]   [facility] [id number]        To Whom It May Concern,     I saw the patient Sang Mtz on 8/22/23 for diarrhea. I am writing a letter of necessity for the stool cultures ordered. He had recent travel to a foreign country ([place]) and has ongoing watery stools with fecal urgency x2 mos post travel. He did end up testing positive for Norovirus on the stool tests. Please consider this as documentation to support the ordered test. I did include my office visit notes. Thank you.      Sincerely,        Jackelyn Oquendo, NP

## 2023-12-14 NOTE — TELEPHONE ENCOUNTER
Please let pt know I tried to call his insurance company about the stool tests    I also submitted a letter of necessity and updated my notes to include the hx of foreign travel.  Hopefully this helps.    MA/GIULIANA   Forms in outbox to fax    KILLIAN Owens, CNP

## 2023-12-20 ENCOUNTER — MYC MEDICAL ADVICE (OUTPATIENT)
Dept: PEDIATRICS | Facility: CLINIC | Age: 63
End: 2023-12-20

## 2023-12-20 DIAGNOSIS — R19.5 WATERY STOOLS: Primary | ICD-10-CM

## 2023-12-21 NOTE — TELEPHONE ENCOUNTER
Dr Villalba    Would you like to do any lab add on tests? Please review  Message and advise.      Hermila MAC RN on 12/21/2023 at 8:41 AM

## 2023-12-22 ENCOUNTER — LAB (OUTPATIENT)
Dept: LAB | Facility: CLINIC | Age: 63
End: 2023-12-22
Payer: COMMERCIAL

## 2023-12-22 DIAGNOSIS — E78.5 HYPERLIPIDEMIA LDL GOAL <130: ICD-10-CM

## 2023-12-22 DIAGNOSIS — Z00.00 MEDICARE ANNUAL WELLNESS VISIT, INITIAL: ICD-10-CM

## 2023-12-22 DIAGNOSIS — Z12.5 PROSTATE CANCER SCREENING: ICD-10-CM

## 2023-12-22 LAB
ALBUMIN SERPL BCG-MCNC: 4.5 G/DL (ref 3.5–5.2)
ALP SERPL-CCNC: 110 U/L (ref 40–150)
ALT SERPL W P-5'-P-CCNC: 30 U/L (ref 0–70)
ANION GAP SERPL CALCULATED.3IONS-SCNC: 10 MMOL/L (ref 7–15)
AST SERPL W P-5'-P-CCNC: 22 U/L (ref 0–45)
BILIRUB SERPL-MCNC: 0.7 MG/DL
BUN SERPL-MCNC: 17 MG/DL (ref 8–23)
CALCIUM SERPL-MCNC: 9.4 MG/DL (ref 8.8–10.2)
CHLORIDE SERPL-SCNC: 105 MMOL/L (ref 98–107)
CHOLEST SERPL-MCNC: 141 MG/DL
CREAT SERPL-MCNC: 0.96 MG/DL (ref 0.67–1.17)
DEPRECATED HCO3 PLAS-SCNC: 29 MMOL/L (ref 22–29)
EGFRCR SERPLBLD CKD-EPI 2021: 88 ML/MIN/1.73M2
FASTING STATUS PATIENT QL REPORTED: YES
GLUCOSE SERPL-MCNC: 99 MG/DL (ref 70–99)
HDLC SERPL-MCNC: 25 MG/DL
LDLC SERPL CALC-MCNC: 87 MG/DL
NONHDLC SERPL-MCNC: 116 MG/DL
POTASSIUM SERPL-SCNC: 4 MMOL/L (ref 3.4–5.3)
PROT SERPL-MCNC: 7.6 G/DL (ref 6.4–8.3)
PSA SERPL DL<=0.01 NG/ML-MCNC: 1.1 NG/ML (ref 0–4.5)
SODIUM SERPL-SCNC: 144 MMOL/L (ref 135–145)
TRIGL SERPL-MCNC: 143 MG/DL

## 2023-12-22 PROCEDURE — 80053 COMPREHEN METABOLIC PANEL: CPT

## 2023-12-22 PROCEDURE — 80061 LIPID PANEL: CPT

## 2023-12-22 PROCEDURE — G0103 PSA SCREENING: HCPCS

## 2023-12-22 PROCEDURE — 36415 COLL VENOUS BLD VENIPUNCTURE: CPT

## 2024-02-13 ENCOUNTER — LAB (OUTPATIENT)
Dept: LAB | Facility: CLINIC | Age: 64
End: 2024-02-13
Payer: COMMERCIAL

## 2024-02-13 DIAGNOSIS — E78.5 HYPERLIPIDEMIA WITH TARGET LDL LESS THAN 130: ICD-10-CM

## 2024-02-13 LAB
ALBUMIN SERPL BCG-MCNC: 4.3 G/DL (ref 3.5–5.2)
ALP SERPL-CCNC: 110 U/L (ref 40–150)
ALT SERPL W P-5'-P-CCNC: 26 U/L (ref 0–70)
ANION GAP SERPL CALCULATED.3IONS-SCNC: 8 MMOL/L (ref 7–15)
AST SERPL W P-5'-P-CCNC: 23 U/L (ref 0–45)
BILIRUB SERPL-MCNC: 0.7 MG/DL
BUN SERPL-MCNC: 17.7 MG/DL (ref 8–23)
CALCIUM SERPL-MCNC: 9.4 MG/DL (ref 8.8–10.2)
CHLORIDE SERPL-SCNC: 102 MMOL/L (ref 98–107)
CHOLEST SERPL-MCNC: 105 MG/DL
CREAT SERPL-MCNC: 0.91 MG/DL (ref 0.67–1.17)
DEPRECATED HCO3 PLAS-SCNC: 29 MMOL/L (ref 22–29)
EGFRCR SERPLBLD CKD-EPI 2021: >90 ML/MIN/1.73M2
FASTING STATUS PATIENT QL REPORTED: YES
GLUCOSE SERPL-MCNC: 102 MG/DL (ref 70–99)
HDLC SERPL-MCNC: 25 MG/DL
LDLC SERPL CALC-MCNC: 55 MG/DL
NONHDLC SERPL-MCNC: 80 MG/DL
POTASSIUM SERPL-SCNC: 4 MMOL/L (ref 3.4–5.3)
PROT SERPL-MCNC: 7.6 G/DL (ref 6.4–8.3)
SODIUM SERPL-SCNC: 139 MMOL/L (ref 135–145)
TRIGL SERPL-MCNC: 124 MG/DL

## 2024-02-13 PROCEDURE — 80061 LIPID PANEL: CPT

## 2024-02-13 PROCEDURE — 36415 COLL VENOUS BLD VENIPUNCTURE: CPT

## 2024-02-13 PROCEDURE — 80053 COMPREHEN METABOLIC PANEL: CPT

## 2024-05-29 NOTE — RESULT ENCOUNTER NOTE
Serology (COVID-19) Notification    You have tested NEGATIVE for COVID-19 antibodies  Letter sent to patient   104

## 2024-12-13 SDOH — HEALTH STABILITY: PHYSICAL HEALTH: ON AVERAGE, HOW MANY DAYS PER WEEK DO YOU ENGAGE IN MODERATE TO STRENUOUS EXERCISE (LIKE A BRISK WALK)?: 4 DAYS

## 2024-12-13 SDOH — HEALTH STABILITY: PHYSICAL HEALTH: ON AVERAGE, HOW MANY MINUTES DO YOU ENGAGE IN EXERCISE AT THIS LEVEL?: 50 MIN

## 2024-12-13 ASSESSMENT — SOCIAL DETERMINANTS OF HEALTH (SDOH): HOW OFTEN DO YOU GET TOGETHER WITH FRIENDS OR RELATIVES?: ONCE A WEEK

## 2024-12-18 ENCOUNTER — OFFICE VISIT (OUTPATIENT)
Dept: PEDIATRICS | Facility: CLINIC | Age: 64
End: 2024-12-18
Payer: COMMERCIAL

## 2024-12-18 VITALS
HEIGHT: 76 IN | DIASTOLIC BLOOD PRESSURE: 85 MMHG | BODY MASS INDEX: 32.43 KG/M2 | HEART RATE: 53 BPM | OXYGEN SATURATION: 99 % | RESPIRATION RATE: 16 BRPM | WEIGHT: 266.3 LBS | SYSTOLIC BLOOD PRESSURE: 119 MMHG | TEMPERATURE: 97.9 F

## 2024-12-18 DIAGNOSIS — F32.0 MAJOR DEPRESSIVE DISORDER, SINGLE EPISODE, MILD (H): ICD-10-CM

## 2024-12-18 DIAGNOSIS — R19.5 WATERY STOOLS: ICD-10-CM

## 2024-12-18 DIAGNOSIS — K21.9 GASTROESOPHAGEAL REFLUX DISEASE WITHOUT ESOPHAGITIS: ICD-10-CM

## 2024-12-18 DIAGNOSIS — E78.5 HYPERLIPIDEMIA LDL GOAL <130: ICD-10-CM

## 2024-12-18 DIAGNOSIS — Z12.5 PROSTATE CANCER SCREENING: ICD-10-CM

## 2024-12-18 DIAGNOSIS — Z00.00 ENCOUNTER FOR MEDICARE ANNUAL WELLNESS EXAM: Primary | ICD-10-CM

## 2024-12-18 DIAGNOSIS — E78.5 HYPERLIPIDEMIA WITH TARGET LDL LESS THAN 130: ICD-10-CM

## 2024-12-18 LAB
ALBUMIN SERPL BCG-MCNC: 4.4 G/DL (ref 3.5–5.2)
ALP SERPL-CCNC: 107 U/L (ref 40–150)
ALT SERPL W P-5'-P-CCNC: 73 U/L (ref 0–70)
ANION GAP SERPL CALCULATED.3IONS-SCNC: 11 MMOL/L (ref 7–15)
AST SERPL W P-5'-P-CCNC: 42 U/L (ref 0–45)
BILIRUB SERPL-MCNC: 0.8 MG/DL
BUN SERPL-MCNC: 21 MG/DL (ref 8–23)
CALCIUM SERPL-MCNC: 9.7 MG/DL (ref 8.8–10.4)
CHLORIDE SERPL-SCNC: 103 MMOL/L (ref 98–107)
CHOLEST SERPL-MCNC: 102 MG/DL
CREAT SERPL-MCNC: 0.91 MG/DL (ref 0.67–1.17)
EGFRCR SERPLBLD CKD-EPI 2021: >90 ML/MIN/1.73M2
FASTING STATUS PATIENT QL REPORTED: YES
FASTING STATUS PATIENT QL REPORTED: YES
GLUCOSE SERPL-MCNC: 91 MG/DL (ref 70–99)
HCO3 SERPL-SCNC: 26 MMOL/L (ref 22–29)
HDLC SERPL-MCNC: 24 MG/DL
LDLC SERPL CALC-MCNC: 54 MG/DL
LIPASE SERPL-CCNC: 31 U/L (ref 13–60)
NONHDLC SERPL-MCNC: 78 MG/DL
POTASSIUM SERPL-SCNC: 4.4 MMOL/L (ref 3.4–5.3)
PROT SERPL-MCNC: 7.7 G/DL (ref 6.4–8.3)
PSA SERPL DL<=0.01 NG/ML-MCNC: 1.48 NG/ML (ref 0–4.5)
SODIUM SERPL-SCNC: 140 MMOL/L (ref 135–145)
TRIGL SERPL-MCNC: 118 MG/DL

## 2024-12-18 RX ORDER — ESCITALOPRAM OXALATE 10 MG/1
10 TABLET ORAL DAILY
Qty: 90 TABLET | Refills: 3 | Status: SHIPPED | OUTPATIENT
Start: 2024-12-18

## 2024-12-18 RX ORDER — SIMVASTATIN 20 MG
20 TABLET ORAL AT BEDTIME
Qty: 90 TABLET | Refills: 3 | Status: SHIPPED | OUTPATIENT
Start: 2024-12-18

## 2024-12-18 RX ORDER — OMEPRAZOLE 40 MG/1
40 CAPSULE, DELAYED RELEASE ORAL DAILY
Qty: 90 CAPSULE | Refills: 3 | Status: SHIPPED | OUTPATIENT
Start: 2024-12-18

## 2024-12-18 ASSESSMENT — ACTIVITIES OF DAILY LIVING (ADL): CURRENT_FUNCTION: NO ASSISTANCE NEEDED

## 2024-12-18 ASSESSMENT — PAIN SCALES - GENERAL: PAINLEVEL_OUTOF10: NO PAIN (0)

## 2024-12-18 NOTE — PROGRESS NOTES
Preventive Care Visit  [facility] JONATHAN Villalba MD, Internal Medicine - Pediatrics  Dec 18, 2024      Assessment & Plan     Encounter for Medicare annual wellness exam  d/w pt preventative care measures including seat belt use, bike helmet, moderation of EtOH, avoiding tobacco, avoiding excessive sun exposure/sunscreen, wt management or wt loss if BMI > 30, need to screen for lipid disorders, mood disorders, CAD risk factors, etc. Also discussed accident prevention and future RHM schedule.   - Comprehensive metabolic panel (BMP + Alb, Alk Phos, ALT, AST, Total. Bili, TP); Future  - PSA, screen; Future  - Lipid panel reflex to direct LDL Fasting; Future  - Comprehensive metabolic panel (BMP + Alb, Alk Phos, ALT, AST, Total. Bili, TP)  - PSA, screen  - Lipid panel reflex to direct LDL Fasting    Major depressive disorder, single episode, mild (H)  discussed with patient (or patient's parents/caregiver) pathophysiology of condition and treatment options.  Well controlled on current medication(s).   - escitalopram (LEXAPRO) 10 MG tablet; Take 1 tablet (10 mg) by mouth daily.  - Comprehensive metabolic panel (BMP + Alb, Alk Phos, ALT, AST, Total. Bili, TP); Future  - Comprehensive metabolic panel (BMP + Alb, Alk Phos, ALT, AST, Total. Bili, TP)    Gastroesophageal reflux disease without esophagitis  discussed with patient (or patient's parents/caregiver) pathophysiology of condition and treatment options.  Well controlled on current medication(s). reivwed diet, etc.   - omeprazole (PRILOSEC) 40 MG DR capsule; Take 1 capsule (40 mg) by mouth daily. 30-60 minutes before a meal.    Hyperlipidemia with target LDL less than 130  Tolerating stain without side effects or issues. reviwed diet, etc.  due for labs.   - simvastatin (ZOCOR) 20 MG tablet; Take 1 tablet (20 mg) by mouth at bedtime.  - Comprehensive metabolic panel (BMP + Alb, Alk Phos, ALT, AST, Total. Bili, TP); Future  - Lipid panel reflex  "to direct LDL Fasting; Future  - Comprehensive metabolic panel (BMP + Alb, Alk Phos, ALT, AST, Total. Bili, TP)  - Lipid panel reflex to direct LDL Fasting    Hyperlipidemia LDL goal <130  discussed with patient (or patient's parents/caregiver) pathophysiology of condition and treatment options.  as above    Prostate cancer screening  - PSA, screen; Future  - PSA, screen    Watery stools  discussed with patient (or patient's parents/caregiver) pathophysiology of condition and treatment options.  reviwed keep food journal and avid foods that may exacerbate (e.g FODMAPs).  reviewed, no concerns. Prescription sent electronically    - Lipase    Return in about 1 year (around 12/18/2025) for Routine Visit, or sooner if symptoms persist or worsen.        BMI  Estimated body mass index is 32.73 kg/m  as calculated from the following:    Height as of this encounter: 1.921 m (6' 3.63\").    Weight as of this encounter: 120.8 kg (266 lb 4.8 oz).       Counseling  Appropriate preventive services were addressed with this patient via screening, questionnaire, or discussion as appropriate for fall prevention, nutrition, physical activity, Tobacco-use cessation, social engagement, weight loss and cognition.  Checklist reviewing preventive services available has been given to the patient.  Reviewed patient's diet, addressing concerns and/or questions.       Return in about 1 year (around 12/18/2025) for Routine Visit, or sooner if symptoms persist or worsen.      Dawit Odell is a 64 year old, presenting for the following:  Physical        12/18/2024    10:24 AM   Additional Questions   Roomed by Nancy HOBBS   Accompanied by Self         12/18/2024    10:24 AM   Patient Reported Additional Medications   Patient reports taking the following new medications No          Healthy Habits:     In general, how would you rate your overall health?  Excellent    Frequency of exercise:  4-5 days/week    Duration of exercise:  45-60 " "minutes    Do you usually eat at least 4 servings of fruit and vegetables a day, include whole grains    & fiber and avoid regularly eating high fat or \"junk\" foods?  No    Taking medications regularly:  Yes    Barriers to taking medications:  None    Medication side effects:  None    Ability to successfully perform activities of daily living:  No assistance needed    Home Safety:  No safety concerns identified    Hearing Impairment:  No hearing concerns    In the past 6 months, have you been bothered by leaking of urine?  No    In general, how would you rate your overall mental or emotional health?  Excellent    Additional concerns today:  Yes (be sure that i am up to date on vaccines. and is fasting if needing blood work)  Patient presents today for his annual visit.  Overall he is doing quite well.  Feels it is been a very good year is enjoying traveling to [place] once a month.  He notes he needs to watch his food is if he eats specific foods including things like tacos he does get loose stools and even diarrhea.  This is improved from previous and his last colonoscopy was 3 years ago.  Plan was to recheck again in 2026.  No fevers chills night sweats weight loss or any other symptoms.  He is taking all medications and notes no side effects.  Notes his mood overall is good.  He is fasting today for labs.  He has no other concerns or complaints today.          Health Care Directive  Patient does not have a Health Care Directive: Patient states has Advance Directive and will bring in a copy to clinic.      12/13/2024   General Health   How would you rate your overall physical health? Excellent   Feel stress (tense, anxious, or unable to sleep) Not at all            12/13/2024   Nutrition   Diet: Regular (no restrictions)            12/13/2024   Exercise   Days per week of moderate/strenous exercise 4 days   Average minutes spent exercising at this level 50 min            12/13/2024   Social Factors   Frequency of " gathering with friends or relatives Once a week   Worry food won't last until get money to buy more No   Food not last or not have enough money for food? No   Do you have housing? (Housing is defined as stable permanent housing and does not include staying ouside in a car, in a tent, in an abandoned building, in an overnight shelter, or couch-surfing.) Yes   Are you worried about losing your housing? No   Lack of transportation? No   Unable to get utilities (heat,electricity)? No            12/13/2024   Fall Risk   Fallen 2 or more times in the past year? No     No    Trouble with walking or balance? No     No        Patient-reported    Multiple values from one day are sorted in reverse-chronological order          12/13/2024   Activities of Daily Living- Home Safety   Needs help with the following daily activites None of the above   Safety concerns in the home None of the above            12/13/2024   Dental   Dentist two times every year? Yes            12/13/2024   Hearing Screening   Hearing concerns? None of the above            12/13/2024   Driving Risk Screening   Patient/family members have concerns about driving No            12/13/2024   General Alertness/Fatigue Screening   Have you been more tired than usual lately? No            12/13/2024   Urinary Incontinence Screening   Bothered by leaking urine in past 6 months No            12/13/2024   TB Screening   Were you born outside of the [place]? No          Today's PHQ-9 Score:       12/18/2024    10:20 AM   PHQ-9 SCORE   PHQ-9 Total Score MyChart 0   PHQ-9 Total Score 0        Patient-reported         12/13/2024   Substance Use   Alcohol more than 3/day or more than 7/wk Not Applicable   Do you have a current opioid prescription? No   How severe/bad is pain from 1 to 10? 0/10 (No Pain)   Do you use any other substances recreationally? No        Social History     Tobacco Use    Smoking status: Never     Passive exposure: Never    Smokeless tobacco: Never    Vaping Use    Vaping status: Never Used   Substance Use Topics    Alcohol use: No    Drug use: No           12/13/2024   AAA Screening   Family history of Abdominal Aortic Aneurysm (AAA)? No      Last PSA:   PSA   Date Value Ref Range Status   06/02/2021 1.45 0 - 4 ug/L Final     Comment:     Assay Method:  Chemiluminescence using Siemens Vista analyzer     Prostate Specific Antigen Screen   Date Value Ref Range Status   12/22/2023 1.10 0.00 - 4.50 ng/mL Final   06/22/2022 1.75 0.00 - 4.00 ug/L Final     ASCVD Risk   The ASCVD Risk score (Nahid GONZALES, et al., 2019) failed to calculate for the following reasons:    The valid total cholesterol range is 130 to 320 mg/dL            Reviewed and updated as needed this visit by Provider                      Current providers sharing in care for this patient include:  Patient Care Team:  Jethro Villalba MD as PCP - General  Jethro Villalba MD as Assigned PCP    The following health maintenance items are reviewed in Epic and correct as of today:  Health Maintenance   Topic Date Due    ANNUAL REVIEW OF HM ORDERS  07/12/2024    INFLUENZA VACCINE (1) 09/01/2024    COVID-19 Vaccine (7 - 2024-25 season) 09/01/2024    MEDICARE ANNUAL WELLNESS VISIT  12/13/2024    PHQ-9  06/18/2025    FALL RISK ASSESSMENT  12/18/2025    COLORECTAL CANCER SCREENING  08/20/2026    GLUCOSE  02/13/2027    ADVANCE CARE PLANNING  12/15/2028    LIPID  02/13/2029    DTAP/TDAP/TD IMMUNIZATION (4 - Td or Tdap) 02/08/2033    RSV VACCINE (1 - 1-dose 75+ series) 07/24/2033    HEPATITIS C SCREENING  Completed    DEPRESSION ACTION PLAN  Completed    Pneumococcal Vaccine: 65+ Years  Completed    ZOSTER IMMUNIZATION  Completed    HPV IMMUNIZATION  Aged Out    MENINGITIS IMMUNIZATION  Aged Out    RSV MONOCLONAL ANTIBODY  Aged Out            Objective    Exam  /85 (BP Location: Right arm, Patient Position: Sitting, Cuff Size: Adult Large)   Pulse 53   Temp 97.9  F (36.6  C) (Oral)   Resp 16   " Ht 1.921 m (6' 3.63\")   Wt 120.8 kg (266 lb 4.8 oz)   SpO2 99%   BMI 32.73 kg/m     Estimated body mass index is 32.73 kg/m  as calculated from the following:    Height as of this encounter: 1.921 m (6' 3.63\").    Weight as of this encounter: 120.8 kg (266 lb 4.8 oz).    Physical Exam  GENERAL: healthy, alert and no distress  EYES: Eyes grossly normal to inspection, PERRL and conjunctivae and sclerae normal  HENT: ear canals and TM's normal, op clear, mucous membranes moist   NECK: no adenopathy, no asymmetry  RESP: lungs clear to auscultation - no rales, rhonchi or wheezes  CV: regular rate and rhythm, normal S1 S2, no S3 or S4, no murmur, click or rub, no peripheral edema   ABDOMEN: soft, nontender, no hepatosplenomegaly, no masses and bowel sounds normal  MS: no gross musculoskeletal defects noted, no edema  SKIN: no suspicious lesions or rashes  NEURO: Normal strength and tone, mentation intact and speech normal  PSYCH: mentation appears normal, affect normal/bright          12/18/2024   Mini Cog   Clock Draw Score 2 Normal   3 Item Recall 3 objects recalled   Mini Cog Total Score 5            Vision Screen  Reason Vision Screen Not Completed: Screening Recommend: Patient/Guardian Declined (sees eye doctor multiple times a year due to eye surgery)  Patient wears corrective lenses (select all that apply): Wears regularly, Comments      Signed Electronically by: Jethro Villalba MD    (*documentation in this chart was partially completed using Dragon [employer] which could potentially lead to some misspellings or grammar/autocorrect issues in the narrative.)      Answers submitted by the patient for this visit:  Patient Health Questionnaire (Submitted on 12/18/2024)  If you checked off any problems, how difficult have these problems made it for you to do your work, take care of things at home, or get along with other people?: Not difficult at all  PHQ9 TOTAL SCORE: 0    "

## 2024-12-18 NOTE — PATIENT INSTRUCTIONS
Patient Education   Preventive Care Advice   This is general advice given by our system to help you stay healthy. However, your care team may have specific advice just for you. Please talk to your care team about your preventive care needs.  Nutrition  Eat 5 or more servings of fruits and vegetables each day.  Try wheat bread, brown rice and whole grain pasta (instead of white bread, rice, and pasta).  Get enough calcium and vitamin D. Check the label on foods and aim for 100% of the RDA (recommended daily allowance).  Lifestyle  Exercise at least 150 minutes each week  (30 minutes a day, 5 days a week).  Do muscle strengthening activities 2 days a week. These help control your weight and prevent disease.  No smoking.  Wear sunscreen to prevent skin cancer.  Have a dental exam and cleaning every 6 months.  Yearly exams  See your health care team every year to talk about:  Any changes in your health.  Any medicines your care team has prescribed.  Preventive care, family planning, and ways to prevent chronic diseases.  Shots (vaccines)   HPV shots (up to age 26), if you've never had them before.  Hepatitis B shots (up to age 59), if you've never had them before.  COVID-19 shot: Get this shot when it's due.  Flu shot: Get a flu shot every year.  Tetanus shot: Get a tetanus shot every 10 years.  Pneumococcal, hepatitis A, and RSV shots: Ask your care team if you need these based on your risk.  Shingles shot (for age 50 and up)  General health tests  Diabetes screening:  Starting at age 35, Get screened for diabetes at least every 3 years.  If you are younger than age 35, ask your care team if you should be screened for diabetes.  Cholesterol test: At age 39, start having a cholesterol test every 5 years, or more often if advised.  Bone density scan (DEXA): At age 50, ask your care team if you should have this scan for osteoporosis (brittle bones).  Hepatitis C: Get tested at least once in your life.  STIs (sexually  transmitted infections)  Before age 24: Ask your care team if you should be screened for STIs.  After age 24: Get screened for STIs if you're at risk. You are at risk for STIs (including HIV) if:  You are sexually active with more than one person.  You don't use condoms every time.  You or a partner was diagnosed with a sexually transmitted infection.  If you are at risk for HIV, ask about PrEP medicine to prevent HIV.  Get tested for HIV at least once in your life, whether you are at risk for HIV or not.  Cancer screening tests  Cervical cancer screening: If you have a cervix, begin getting regular cervical cancer screening tests starting at age 21.  Breast cancer scan (mammogram): If you've ever had breasts, begin having regular mammograms starting at age 40. This is a scan to check for breast cancer.  Colon cancer screening: It is important to start screening for colon cancer at age 45.  Have a colonoscopy test every 10 years (or more often if you're at risk) Or, ask your provider about stool tests like a FIT test every year or Cologuard test every 3 years.  To learn more about your testing options, visit:   .  For help making a decision, visit:   https://bit.ly/yx62808.  Prostate cancer screening test: If you have a prostate, ask your care team if a prostate cancer screening test (PSA) at age 55 is right for you.  Lung cancer screening: If you are a current or former smoker ages 50 to 80, ask your care team if ongoing lung cancer screenings are right for you.  For informational purposes only. Not to replace the advice of your health care provider. Copyright   2023 [facility] [employer]. All rights reserved. Clinically reviewed by the [facility] Transitions Program. FOBO [id number] - REV 01/24.  Hearing Loss: Care Instructions  Overview     Hearing loss is a sudden or slow decrease in how well you hear. It can range from slight to profound. Permanent hearing loss can occur with aging. It also can  happen when you are exposed long-term to loud noise. Examples include listening to loud music, riding motorcycles, or being around other loud machines.  Hearing loss can affect your work and home life. It can make you feel lonely or depressed. You may feel that you have lost your independence. But hearing aids and other devices can help you hear better and feel connected to others.  Follow-up care is a key part of your treatment and safety. Be sure to make and go to all appointments, and call your doctor if you are having problems. It's also a good idea to know your test results and keep a list of the medicines you take.  How can you care for yourself at home?  Avoid loud noises whenever possible. This helps keep your hearing from getting worse.  Always wear hearing protection around loud noises.  Wear a hearing aid as directed.  A professional can help you pick a hearing aid that will work best for you.  You can also get hearing aids over the counter for mild to moderate hearing loss.  Have hearing tests as your doctor suggests. They can show whether your hearing has changed. Your hearing aid may need to be adjusted.  Use other devices as needed. These may include:  Telephone amplifiers and hearing aids that can connect to a television, stereo, radio, or microphone.  Devices that use lights or vibrations. These alert you to the doorbell, a ringing telephone, or a baby monitor.  Television closed-captioning. This shows the words at the bottom of the screen. Most new TVs can do this.  TTY (text telephone). This lets you type messages back and forth on the telephone instead of talking or listening. These devices are also called TDD. When messages are typed on the keyboard, they are sent over the phone line to a receiving TTY. The message is shown on a monitor.  Use text messaging, social media, and email if it is hard for you to communicate by telephone.  Try to learn a listening technique called speechreading. It is  "not lipreading. You pay attention to people's gestures, expressions, posture, and tone of voice. These clues can help you understand what a person is saying. Face the person you are talking to, and have them face you. Make sure the lighting is good. You need to see the other person's face clearly.  Think about counseling if you need help to adjust to your hearing loss.  When should you call for help?  Watch closely for changes in your health, and be sure to contact your doctor if:    You think your hearing is getting worse.     You have new symptoms, such as dizziness or nausea.   Where can you learn more?  Go to https://www.[employer].com.net/patiented  Enter R798 in the search box to learn more about \"Hearing Loss: Care Instructions.\"  Current as of: September 27, 2023  Content Version: 14.3    2024 [employer].   Care instructions adapted under license by your healthcare professional. If you have questions about a medical condition or this instruction, always ask your healthcare professional. [employer] disclaims any warranty or liability for your use of this information.       "

## 2024-12-19 ENCOUNTER — MYC MEDICAL ADVICE (OUTPATIENT)
Dept: PEDIATRICS | Facility: CLINIC | Age: 64
End: 2024-12-19
Payer: COMMERCIAL

## 2024-12-19 DIAGNOSIS — R74.01 ELEVATED ALT MEASUREMENT: Primary | ICD-10-CM

## 2025-06-11 ENCOUNTER — PATIENT OUTREACH (OUTPATIENT)
Dept: CARE COORDINATION | Facility: CLINIC | Age: 65
End: 2025-06-11
Payer: COMMERCIAL

## 2025-07-10 ENCOUNTER — PATIENT OUTREACH (OUTPATIENT)
Dept: CARE COORDINATION | Facility: CLINIC | Age: 65
End: 2025-07-10
Payer: COMMERCIAL

## (undated) RX ORDER — FENTANYL CITRATE 50 UG/ML
INJECTION, SOLUTION INTRAMUSCULAR; INTRAVENOUS
Status: DISPENSED
Start: 2017-08-04

## (undated) RX ORDER — ONDANSETRON 2 MG/ML
INJECTION INTRAMUSCULAR; INTRAVENOUS
Status: DISPENSED
Start: 2017-08-04